# Patient Record
Sex: FEMALE | Race: WHITE | NOT HISPANIC OR LATINO | Employment: OTHER | URBAN - METROPOLITAN AREA
[De-identification: names, ages, dates, MRNs, and addresses within clinical notes are randomized per-mention and may not be internally consistent; named-entity substitution may affect disease eponyms.]

---

## 2019-04-01 ENCOUNTER — OFFICE VISIT (OUTPATIENT)
Dept: PAIN MEDICINE | Facility: CLINIC | Age: 69
End: 2019-04-01
Payer: MEDICARE

## 2019-04-01 VITALS
WEIGHT: 176.56 LBS | OXYGEN SATURATION: 95 % | SYSTOLIC BLOOD PRESSURE: 150 MMHG | DIASTOLIC BLOOD PRESSURE: 70 MMHG | BODY MASS INDEX: 30.14 KG/M2 | HEIGHT: 64 IN | RESPIRATION RATE: 20 BRPM | HEART RATE: 80 BPM | TEMPERATURE: 96 F

## 2019-04-01 DIAGNOSIS — L40.9 PSORIASIS: ICD-10-CM

## 2019-04-01 DIAGNOSIS — M51.36 DDD (DEGENERATIVE DISC DISEASE), LUMBAR: ICD-10-CM

## 2019-04-01 DIAGNOSIS — M54.16 RIGHT LUMBAR RADICULOPATHY: Primary | ICD-10-CM

## 2019-04-01 PROCEDURE — 99999 PR PBB SHADOW E&M-NEW PATIENT-LVL IV: ICD-10-PCS | Mod: PBBFAC,,, | Performed by: ANESTHESIOLOGY

## 2019-04-01 PROCEDURE — 99204 OFFICE O/P NEW MOD 45 MIN: CPT | Mod: PBBFAC,PN | Performed by: ANESTHESIOLOGY

## 2019-04-01 PROCEDURE — 99999 PR PBB SHADOW E&M-NEW PATIENT-LVL IV: CPT | Mod: PBBFAC,,, | Performed by: ANESTHESIOLOGY

## 2019-04-01 PROCEDURE — 99204 OFFICE O/P NEW MOD 45 MIN: CPT | Mod: S$PBB,,, | Performed by: ANESTHESIOLOGY

## 2019-04-01 PROCEDURE — 99204 PR OFFICE/OUTPT VISIT, NEW, LEVL IV, 45-59 MIN: ICD-10-PCS | Mod: S$PBB,,, | Performed by: ANESTHESIOLOGY

## 2019-04-01 RX ORDER — GABAPENTIN 300 MG/1
300 CAPSULE ORAL NIGHTLY
Qty: 30 CAPSULE | Refills: 1 | Status: SHIPPED | OUTPATIENT
Start: 2019-04-01 | End: 2019-05-28 | Stop reason: SDUPTHER

## 2019-04-01 RX ORDER — TRAMADOL HYDROCHLORIDE 50 MG/1
50 TABLET ORAL 3 TIMES DAILY PRN
Qty: 20 TABLET | Refills: 1 | Status: SHIPPED | OUTPATIENT
Start: 2019-04-01 | End: 2019-05-10 | Stop reason: SDUPTHER

## 2019-04-01 NOTE — PROGRESS NOTES
This note was completed with dictation software and grammatical errors may exist.    CC:Back pain    HPI:  The patient is a 68-year-old woman with a history of CVAs presents in self-referral for back and right leg pain. The patient states that she was in normal state of health until about three months ago when she began having pain in the left buttock, posterior thigh and radiating into the lower leg.  She states that there had been no incidents or trauma which may have caused this.  She states that it has become constant but is especially worse with standing for long periods, sitting too long.  She does have some pain deep in her low back but primarily  radiates into the right side.  She gets some relief with lying down, has also been taking Aleve and ibuprofen with mild relief.  She denies any joe weakness, no bowel or bladder incontinence.      ROS:  She reports weight gain, constipation, easy bruising, back pain, dizziness, anxiety, depression and loss of balance.  Balance of review of systems is negative.    Past Medical History:   Diagnosis Date    Stroke        Past Surgical History:   Procedure Laterality Date    HIP SURGERY         Social History     Socioeconomic History    Marital status:      Spouse name: None    Number of children: None    Years of education: None    Highest education level: None   Occupational History    None   Social Needs    Financial resource strain: None    Food insecurity:     Worry: None     Inability: None    Transportation needs:     Medical: None     Non-medical: None   Tobacco Use    Smoking status: Current Every Day Smoker   Substance and Sexual Activity    Alcohol use: None    Drug use: None    Sexual activity: None   Lifestyle    Physical activity:     Days per week: None     Minutes per session: None    Stress: None   Relationships    Social connections:     Talks on phone: None     Gets together: None     Attends Sikh service: None      "Active member of club or organization: None     Attends meetings of clubs or organizations: None     Relationship status: None    Intimate partner violence:     Fear of current or ex partner: None     Emotionally abused: None     Physically abused: None     Forced sexual activity: None   Other Topics Concern    None   Social History Narrative    None         Medications/Allergies: See med card    Vitals:    04/01/19 1035   BP: (!) 150/70   Pulse: 80   Resp: 20   Temp: 96.4 °F (35.8 °C)   TempSrc: Oral   SpO2: 95%   Weight: 80.1 kg (176 lb 9.4 oz)   Height: 5' 4" (1.626 m)   PainSc:   4   PainLoc: Back         Physical exam:    Gen: A and O x3, pleasant, well-groomed  Skin:  Large red plaque covering the medial mid plantar surface of the left foot, also small plaque on left palmar region  HEENT: PERRLA, no obvious deformities on ears or in canals. Trachea midline.  CVS: Regular rate and rhythm, normal palpable pulses.  Resp:No increased work of breathing, symmetrical chest rise.  Abdomen: Soft, NT/ND.  Musculoskeletal:  Walks with a slight antalgic gait, does not put full weight on right leg.    Neuro:  Lower extremities: 5/5 strength bilaterally  Reflexes: Patellar 2+, Achilles 2+ bilaterally.  Sensory:  Intact and symmetrical to light touch and pinprick in L2-S1 dermatomes bilaterally.    Lumbar spine:  Lumbar spine:  Range of motion is mildly reduced with flexion with slight increased pain back, mildly reduced with extension with no major increased pain.  Tacho's test causes no increased pain on either side.    Supine straight leg raise is mildly positive on the right side at 60°.    Internal and external rotation of the hip causes no increased pain on either side.  Myofascial exam: No tenderness to palpation across lumbar paraspinous muscles.    Imaging:  None    Assessment:   The patient is a 68-year-old woman with a history of CVAs presents in self-referral for back and right leg pain.  1. Right lumbar " radiculopathy  MRI Lumbar Spine Without Contrast   2. DDD (degenerative disc disease), lumbar  MRI Lumbar Spine Without Contrast   3. Psoriasis  Ambulatory Referral to Rheumatology       Plan:  1.  I think she has likely nerve root impingement on the right side probably at L4/5 her L5/S1 and we are going to get an MRI of her lumbar spine.  In the meantime I will also have her start taking Neurontin 300 mg nightly.  I will call her with the results.  2.  We discussed that she has a large psoriatic plaque on her left foot, also her left hand, she has never seen a rheumatologist.  I am going to get her set up with Rheumatology.

## 2019-04-16 ENCOUNTER — HOSPITAL ENCOUNTER (OUTPATIENT)
Dept: RADIOLOGY | Facility: HOSPITAL | Age: 69
Discharge: HOME | End: 2019-04-16
Attending: ANESTHESIOLOGY
Payer: MEDICARE

## 2019-04-16 DIAGNOSIS — M54.16 RIGHT LUMBAR RADICULOPATHY: ICD-10-CM

## 2019-04-16 DIAGNOSIS — M51.36 DDD (DEGENERATIVE DISC DISEASE), LUMBAR: ICD-10-CM

## 2019-04-16 PROCEDURE — 72148 MRI LUMBAR SPINE W/O DYE: CPT | Mod: TC,PO

## 2019-04-16 PROCEDURE — 72148 MRI LUMBAR SPINE W/O DYE: CPT | Mod: 26,,, | Performed by: RADIOLOGY

## 2019-04-16 PROCEDURE — 72148 MRI LUMBAR SPINE WITHOUT CONTRAST: ICD-10-PCS | Mod: 26,,, | Performed by: RADIOLOGY

## 2019-04-23 ENCOUNTER — TELEPHONE (OUTPATIENT)
Dept: PAIN MEDICINE | Facility: CLINIC | Age: 69
End: 2019-04-23

## 2019-04-23 NOTE — TELEPHONE ENCOUNTER
----- Message from Chilango Retana sent at 4/23/2019 12:38 PM CDT -----  Contact: pt  Type:  Test Results    Who Called:  pt  Name of Test (Lab/Mammo/Etc):  MRI  Date of Test:  4/16/19  Ordering Provider:  Dr. Vidales  Where the test was performed:  [facility]  Best Call Back Number:  [phone]  Additional Information:  Pt would like to know what the next step is.

## 2019-04-23 NOTE — TELEPHONE ENCOUNTER
----- Message from Rich Trotter sent at 4/23/2019  4:01 PM CDT -----  Contact: Patient  Type:  Patient Returning Call    Who Called:  Ptnt  Who Left Message for Patient: No one  Does the patient know what this is regarding?:  Calling about her MRI results from 04-16-19  Best Call Back Number:  [phone] (C) or [phone] (H)  Additional Information:  Hasn't heard from anyone since 04-16-19 about her results. Still having back pain.

## 2019-04-23 NOTE — TELEPHONE ENCOUNTER
Please let the patient know that I have reviewed her lumbar spine MRI which shows stenosis or narrowing of the spinal canal at L4/5 and also nerve impingement where the nerves exit out to both sides.  I would suggest setting up an L5/S1 BARB with spread to the right side to see if this provides relief of her back and leg pain. Please set her up with a three week follow-up after this.

## 2019-04-24 DIAGNOSIS — M54.16 LUMBAR RADICULOPATHY: Primary | ICD-10-CM

## 2019-04-24 RX ORDER — LORAZEPAM 0.5 MG/1
TABLET ORAL
Refills: 0 | COMMUNITY
Start: 2019-04-15 | End: 2022-08-17

## 2019-04-24 RX ORDER — CLOPIDOGREL BISULFATE 75 MG/1
75 TABLET ORAL DAILY
COMMUNITY

## 2019-04-24 RX ORDER — ATORVASTATIN CALCIUM 20 MG/1
20 TABLET, FILM COATED ORAL DAILY
COMMUNITY

## 2019-04-24 RX ORDER — DIAZEPAM 2 MG/1
5 TABLET ORAL NIGHTLY PRN
COMMUNITY
End: 2019-04-24 | Stop reason: SDUPTHER

## 2019-04-24 RX ORDER — ALPRAZOLAM 0.5 MG/1
1 TABLET, ORALLY DISINTEGRATING ORAL ONCE AS NEEDED
Status: CANCELLED | OUTPATIENT
Start: 2019-05-14 | End: 2030-10-09

## 2019-04-24 RX ORDER — CITALOPRAM 20 MG/1
20 TABLET, FILM COATED ORAL DAILY
COMMUNITY
End: 2022-08-17

## 2019-04-24 NOTE — TELEPHONE ENCOUNTER
Spoke to patient and explained MRI results per Dr Vidales. Answered questions about the procedure and possible relief it could provide. Patient satisfied with information and demonstrated understanding by repeating info back to me. She will be scheduled for procedure on 5/14. Clearance will be faxed to Dr Hurtado to stop plavix, and patient will be mailed a copy of pre-op instructions with follow up appointment.

## 2019-04-24 NOTE — TELEPHONE ENCOUNTER
----- Message from Osbaldo Giraldo sent at 4/24/2019  2:08 PM CDT -----  Contact: self   Patient want to speak with a nurse regarding her test results please call back at [phone] (home) [phone] (work)

## 2019-05-02 ENCOUNTER — TELEPHONE (OUTPATIENT)
Dept: PAIN MEDICINE | Facility: CLINIC | Age: 69
End: 2019-05-02

## 2019-05-02 NOTE — TELEPHONE ENCOUNTER
----- Message from Marcelina Johnson sent at 5/2/2019  4:38 PM CDT -----  Contact: Pt  Type: Needs Medical Advice    Who Called:  Pt  Best Call Back Number: [phone] (home) [phone] (work)  Additional Information:pt said she has a bad pain in her back would like a call back today

## 2019-05-02 NOTE — TELEPHONE ENCOUNTER
----- Message from Lucy Foley sent at 5/2/2019  4:10 PM CDT -----  Contact: Yolanda pt  Type: Needs Medical Advice    Who Called:  Yolanda Melgoza Call Back Number: [phone]  Additional Information: Pls call pt regarding possibly getting the injection done sooner.

## 2019-05-10 RX ORDER — TRAMADOL HYDROCHLORIDE 50 MG/1
50 TABLET ORAL 3 TIMES DAILY PRN
Qty: 20 TABLET | Refills: 1 | Status: SHIPPED | OUTPATIENT
Start: 2019-05-10 | End: 2022-11-29

## 2019-05-10 NOTE — TELEPHONE ENCOUNTER
----- Message from Debra Mtz sent at 5/10/2019 12:47 PM CDT -----  Type:  RX Refill Request    Who Called:  self  Refill or New Rx:  Refill   RX Name and Strength:   traMADol (ULTRAM) 50 mg tablet  How is the patient currently taking it? (ex. 1XDay):    [facility] [address] [place] [address] & [address] [address]  Phone: [phone] Fax: [phone]    Is this a 30 day or 90 day RX:     Preferred Pharmacy with phone number:     Local or Mail Order:     Ordering Provider:     Best Call Back Number:    [phone]   Additional Information:  Asking to have this filled today

## 2019-05-13 ENCOUNTER — TELEPHONE (OUTPATIENT)
Dept: SURGERY | Facility: HOSPITAL | Age: 69
End: 2019-05-13

## 2019-05-13 DIAGNOSIS — M51.36 DDD (DEGENERATIVE DISC DISEASE), LUMBAR: Primary | ICD-10-CM

## 2019-05-13 NOTE — TELEPHONE ENCOUNTER
Spoke with patient on antibiotics is cancelling will call back when ready to reschedule OR notified

## 2019-05-14 ENCOUNTER — TELEPHONE (OUTPATIENT)
Dept: PAIN MEDICINE | Facility: CLINIC | Age: 69
End: 2019-05-14

## 2019-05-14 NOTE — TELEPHONE ENCOUNTER
Left patient a vm stating she can  a copy of the MRI at radiology and can  a copy of the MRI report if she would like.

## 2019-05-14 NOTE — TELEPHONE ENCOUNTER
----- Message from Radhika Barker sent at 5/14/2019  1:24 PM CDT -----  Contact: self [phone] or [phone]  Patient contact # [phone] or [phone]  Patient requesting copy of MRI results.    Fax#[phone] fax to Dr Brambila

## 2019-05-15 ENCOUNTER — TELEPHONE (OUTPATIENT)
Dept: PAIN MEDICINE | Facility: CLINIC | Age: 69
End: 2019-05-15

## 2019-05-15 NOTE — TELEPHONE ENCOUNTER
----- Message from Rosario Rider sent at 5/14/2019  3:33 PM CDT -----  Contact: patient  Type: Needs Medical Advice    Who Called:  patient  Symptoms (please be specific):    How long has patient had these symptoms:    Pharmacy name and phone #:    Best Call Back Number: [phone]  Additional Information: requesting results from mri on 04/16/19 be mailed [address] [place] [address]. Patient stated that she can't come to office to pick them up.

## 2019-05-28 RX ORDER — GABAPENTIN 300 MG/1
CAPSULE ORAL
Qty: 30 CAPSULE | Refills: 3 | Status: SHIPPED | OUTPATIENT
Start: 2019-05-28 | End: 2019-10-03 | Stop reason: SDUPTHER

## 2019-10-03 RX ORDER — GABAPENTIN 300 MG/1
CAPSULE ORAL
Qty: 30 CAPSULE | Refills: 0 | Status: SHIPPED | OUTPATIENT
Start: 2019-10-03

## 2020-09-23 ENCOUNTER — OFFICE VISIT (OUTPATIENT)
Dept: OTOLARYNGOLOGY | Facility: CLINIC | Age: 70
End: 2020-09-23
Payer: MEDICARE

## 2020-09-23 VITALS — TEMPERATURE: 97 F | WEIGHT: 172.81 LBS | BODY MASS INDEX: 29.67 KG/M2

## 2020-09-23 DIAGNOSIS — S03.40XA SPRAIN OF TEMPOROMANDIBULAR JOINT, INITIAL ENCOUNTER: Primary | ICD-10-CM

## 2020-09-23 PROCEDURE — 99999 PR PBB SHADOW E&M-EST. PATIENT-LVL III: CPT | Mod: PBBFAC,,, | Performed by: OTOLARYNGOLOGY

## 2020-09-23 PROCEDURE — 99204 OFFICE O/P NEW MOD 45 MIN: CPT | Mod: S$PBB,,, | Performed by: OTOLARYNGOLOGY

## 2020-09-23 PROCEDURE — 99204 PR OFFICE/OUTPT VISIT, NEW, LEVL IV, 45-59 MIN: ICD-10-PCS | Mod: S$PBB,,, | Performed by: OTOLARYNGOLOGY

## 2020-09-23 PROCEDURE — 99999 PR PBB SHADOW E&M-EST. PATIENT-LVL III: ICD-10-PCS | Mod: PBBFAC,,, | Performed by: OTOLARYNGOLOGY

## 2020-09-23 PROCEDURE — 99213 OFFICE O/P EST LOW 20 MIN: CPT | Mod: PBBFAC,PO | Performed by: OTOLARYNGOLOGY

## 2020-09-23 RX ORDER — CYCLOBENZAPRINE HCL 10 MG
10 TABLET ORAL NIGHTLY PRN
Qty: 14 TABLET | Refills: 0 | Status: SHIPPED | OUTPATIENT
Start: 2020-09-23 | End: 2020-10-07

## 2020-09-23 RX ORDER — HYDROCODONE BITARTRATE AND ACETAMINOPHEN 7.5; 325 MG/1; MG/1
TABLET ORAL
COMMUNITY
Start: 2020-08-12 | End: 2023-10-01 | Stop reason: SDUPTHER

## 2020-09-23 RX ORDER — PREDNISONE 20 MG/1
40 TABLET ORAL DAILY
Qty: 10 TABLET | Refills: 0 | Status: SHIPPED | OUTPATIENT
Start: 2020-09-23 | End: 2020-09-28

## 2020-09-23 NOTE — PATIENT INSTRUCTIONS
Soft diet (minimal chewing) for 2 weeks.   Ibuprofen 600 mg every 8 hours for inflammation  Muscle relaxer at night time  Take steroid as prescribed  Massage the joint and use warm compresses  Follow-up if persistent after 3 weeks    Oral Steroid    You have been prescribed an oral steroid. Use as directed.    Note: This medication can be very effective in treating inflammation but may be associated with several side effects such as:    Stomach irritation (consider antacid medication while you are on prednisone),  insomnia (please take in the morning to reduce this if dosing is once daily), mood changes, high blood pressure, elevated sugar levels (especially in diabetics), infections, fluid retention, rash, swelling, tendon rupture, and hip fracture.     Please report any liver disease, diabetes, osteoporosis, stomach ulcer disease, glaucoma, history of GI bleeding, Myasthenia Gravis PRIOR to initiating this medication.        TMJ Syndrome / Sprain    This is a condition with chronic or recurrent pain in the joint of the jaw (in front of the ear). Just like all other joints in the body (knees, hips, etc.) the jaw joint can be sprained or chronically injured over time. The jaw joint capsule can wear out just like other joints in the body.    The pain may cause limited motion of the jaw, a locking or catching sensation, clicking, popping, or grinding sounds from the joint with movement. It is a very common cause of headache, earache or neck pain. Other symptoms include ringing in the ear, and pressure/fullness of the ear.    The nerve that gives sensation to the jaw joint also gives sensation to the ear and part of the face. This is why pain in the face or ear can be coming from the jaw joint. It is sometimes caused by inflammation in the joint, injury or wear-and-tear of the cartilage in the joint, involuntary grinding of the teeth or poorly fitting dentures. Emotional stress and tension are often a factor. Most  cases resolve completely within a few months with proper treatment.    Home Care:  1) Rest the jaw by avoiding crunchy or hard foods to chew. Do not eat hard or sticky candies. Soft foods and liquids are easier on the jaw. Protect your jaw while yawning.  2) Hot compress (small towel soaked in hot water or heating pad) applied to the jaw may give relief by reducing muscle spasm. Some people get relief with cold packs, so try both and see which one works best for you.  3) You may use ibuprofen (Motrin, Advil) to control pain, unless another medicine was prescribed.   If you weight between 130 and 150 lb, you may take 600 mg of Ibuprofen every 6 hours as needed for a few weeks, then less frequently following this. If you weight > 150 lb, you may take 800 mg every 6 hours for the same time period. Extended use of Ibuprofen is typically OK, but not every 6 hours (usually one or two times per day.)   [ NOTE : If you have chronic liver or kidney disease or ever had a stomach ulcer or GI bleeding, talk with your doctor before using these medicines.]  4) If you suspect emotional stress is related to your condition.  a) Try to identify the sources of stress in your life. It may not be obvious! These may include:  -- Daily hassles of life that pile up (traffic jams, missed appointments, car troubles)  -- Major life changes, both good (new baby, job promotion) and bad (loss of job, loss of loved one)  -- Overload: feeling that you have too many responsibilities and can't take care of everything at once  -- Helplessness: feeling like your problems are more than you can solve  b) When possible, do something about the source of your stress: avoid hassles, limit the amount of change that is happening in your life at one time and take a break when you feel overloaded.  c) Unfortunately, many stressful situations cannot be avoided. Therefore, it is necessary to learn HOW TO MANAGE STRESS better. There are many proven methods that  "work and will reduce your anxiety. These include simple things like exercise, good nutrition and adequate rest. Also, there are certain techniques that are helpful: relaxation and breathing exercises, visualization, biofeedback, meditation or simply taking some time-out to clear your mind. For more information about this, consult your doctor or go to a local bookstore and review the many books and tapes available on this subject.    Mouthguards for Grinding:  Some TMJ issues are worsened by nightly teeth grinding. This can create muscle tension and strain on the jaw joint. Most mouthguards protect the teeth and do NOT reduce the clenching. If the goal is to reduce clenching, I recommend trying an over the counter nightguard called "SmartGuard."  This can be purchased over-the-counter and is available through vendors such as [employer] and [employer].   This mouthguard fits only on the front teeth. As a result, it prevents your molars from contacting, preventing a forceful clench. This should be worn for brief period of time (weeks or months) as it can alter the bite with prolonged use. Most people will notice improvement during this time.    "

## 2020-09-23 NOTE — PROGRESS NOTES
Subjective:       Patient ID: Yolanda Zapata is a 70 y.o. female.    Chief Complaint: Otalgia and Ear Fullness    Yolanda is here for Right otalgia and ear fullness.  She has 1 week of ear pain and feeling of fullness in the right ear. She feels as is she is talking in the bottom of a well. She has some autophony.   She was seen at [facility] and had an audiogram showed moderate SNHL, symmetric      Pertinent meds: plavix,   Pertinent medical issues: history of CVA, R eye blindness    Social History     Tobacco Use   Smoking Status Current Every Day Smoker    Packs/day: 1.00     Social History     Substance and Sexual Activity   Alcohol Use Yes    Comment: 3 glasses of wine per month          Review of Systems   Constitutional: Negative for activity change and appetite change.   Eyes: Negative for discharge.   Respiratory: Negative for difficulty breathing and wheezing   Cardiovascular: Negative for chest pain.   Gastrointestinal: Negative for abdominal distention and abdominal pain.   Endocrine: Negative for cold intolerance and heat intolerance.   Genitourinary: Negative for dysuria.   Musculoskeletal: Negative for gait problem and joint swelling.   Skin: Negative for color change and pallor.   Neurological: Negative for syncope and weakness.   Psychiatric/Behavioral: Negative for agitation and confusion.     Objective:        Constitutional:   She is oriented to person, place, and time. She appears well-developed and well-nourished. She appears alert. She is active. Normal speech.      Head:  Normocephalic and atraumatic. Head is with TMJ tenderness (R Pre-auricular and temporalis pain). No scars. Salivary glands normal.  Facial strength is normal.      Ears:    Right Ear: No drainage or swelling. No middle ear effusion.   Left Ear: No drainage or swelling.  No middle ear effusion.     Nose:  No mucosal edema, rhinorrhea or sinus tenderness. No turbinate hypertrophy.      Mouth/Throat  Oropharynx clear and moist without  lesions or asymmetry, normal uvula midline and mirror exam normal. Normal dentition. No uvula swelling, lacerations or trismus. No oropharyngeal exudate. Tonsillar erythema, tonsillar exudate.      Neck:  Full range of motion with neck supple and no adenopathy. Thyroid tenderness is present. No tracheal deviation, no edema, no erythema, normal range of motion, no stridor, no crepitus and no neck rigidity present. No thyroid mass present.     Cardiovascular:   Intact distal pulses and normal pulses.      Pulmonary/Chest:   Effort normal and breath sounds normal. No stridor.     Psychiatric:   Her speech is normal and behavior is normal. Her mood appears not anxious. Her affect is not labile.     Neurological:   She is alert and oriented to person, place, and time. No sensory deficit.     Skin:   No abrasions, lacerations, lesions, or rashes. No abrasion and no bruising noted.         Tests / Results:  None    Assessment:       1. Sprain of temporomandibular joint, initial encounter          Plan:         Right otalgia 2/2 TMJ.  Soft diet, Prednisone, Flexeril qhs, massage and compresses    FU 3 weeks if persistent

## 2020-10-07 ENCOUNTER — CLINICAL SUPPORT (OUTPATIENT)
Dept: AUDIOLOGY | Facility: CLINIC | Age: 70
End: 2020-10-07
Payer: MEDICARE

## 2020-10-07 ENCOUNTER — OFFICE VISIT (OUTPATIENT)
Dept: OTOLARYNGOLOGY | Facility: CLINIC | Age: 70
End: 2020-10-07
Payer: MEDICARE

## 2020-10-07 VITALS
TEMPERATURE: 77 F | SYSTOLIC BLOOD PRESSURE: 139 MMHG | HEART RATE: 104 BPM | HEIGHT: 63 IN | DIASTOLIC BLOOD PRESSURE: 90 MMHG | WEIGHT: 157.44 LBS | BODY MASS INDEX: 27.89 KG/M2

## 2020-10-07 DIAGNOSIS — H90.A21 SENSORINEURAL HEARING LOSS (SNHL) OF RIGHT EAR WITH RESTRICTED HEARING OF LEFT EAR: ICD-10-CM

## 2020-10-07 DIAGNOSIS — H91.90 PERCEIVED HEARING CHANGES: ICD-10-CM

## 2020-10-07 DIAGNOSIS — H90.A32 MIXED CONDUCTIVE AND SENSORINEURAL HEARING LOSS OF LEFT EAR WITH RESTRICTED HEARING OF RIGHT EAR: Primary | ICD-10-CM

## 2020-10-07 DIAGNOSIS — H69.91 ACUTE DYSFUNCTION OF RIGHT EUSTACHIAN TUBE: Primary | ICD-10-CM

## 2020-10-07 DIAGNOSIS — H90.3 BILATERAL SENSORINEURAL HEARING LOSS: ICD-10-CM

## 2020-10-07 PROCEDURE — 99214 PR OFFICE/OUTPT VISIT, EST, LEVL IV, 30-39 MIN: ICD-10-PCS | Mod: S$PBB,,, | Performed by: NURSE PRACTITIONER

## 2020-10-07 PROCEDURE — 99999 PR PBB SHADOW E&M-EST. PATIENT-LVL V: CPT | Mod: PBBFAC,,, | Performed by: NURSE PRACTITIONER

## 2020-10-07 PROCEDURE — 99999 PR PBB SHADOW E&M-EST. PATIENT-LVL V: ICD-10-PCS | Mod: PBBFAC,,, | Performed by: NURSE PRACTITIONER

## 2020-10-07 PROCEDURE — 99215 OFFICE O/P EST HI 40 MIN: CPT | Mod: PBBFAC,PO | Performed by: NURSE PRACTITIONER

## 2020-10-07 PROCEDURE — 99214 OFFICE O/P EST MOD 30 MIN: CPT | Mod: S$PBB,,, | Performed by: NURSE PRACTITIONER

## 2020-10-07 PROCEDURE — 92567 TYMPANOMETRY: CPT | Mod: PBBFAC,PO | Performed by: AUDIOLOGIST

## 2020-10-07 PROCEDURE — 99999 PR PBB SHADOW E&M-EST. PATIENT-LVL I: CPT | Mod: PBBFAC,,,

## 2020-10-07 PROCEDURE — 92557 COMPREHENSIVE HEARING TEST: CPT | Mod: PBBFAC,PO | Performed by: AUDIOLOGIST

## 2020-10-07 PROCEDURE — 99999 PR PBB SHADOW E&M-EST. PATIENT-LVL I: ICD-10-PCS | Mod: PBBFAC,,,

## 2020-10-07 PROCEDURE — 99211 OFF/OP EST MAY X REQ PHY/QHP: CPT | Mod: PBBFAC,27,PO,25

## 2020-10-07 RX ORDER — BENAZEPRIL HYDROCHLORIDE 20 MG/1
TABLET ORAL
COMMUNITY
End: 2022-07-05 | Stop reason: ALTCHOICE

## 2020-10-07 RX ORDER — OFLOXACIN 3 MG/ML
SOLUTION/ DROPS OPHTHALMIC
COMMUNITY
Start: 2020-09-30 | End: 2022-11-29

## 2020-10-07 RX ORDER — NEPAFENAC 3 MG/ML
SUSPENSION/ DROPS OPHTHALMIC
COMMUNITY
Start: 2020-09-30 | End: 2022-11-29

## 2020-10-07 RX ORDER — FLUTICASONE PROPIONATE 50 MCG
1 SPRAY, SUSPENSION (ML) NASAL 2 TIMES DAILY
Qty: 16 G | Refills: 12 | Status: SHIPPED | OUTPATIENT
Start: 2020-10-07 | End: 2021-10-07

## 2020-10-07 RX ORDER — TEMAZEPAM 30 MG/1
CAPSULE ORAL
COMMUNITY
End: 2022-11-29

## 2020-10-07 RX ORDER — FLUOXETINE HYDROCHLORIDE 40 MG/1
CAPSULE ORAL
COMMUNITY
End: 2022-08-17

## 2020-10-07 RX ORDER — AZELASTINE 1 MG/ML
1 SPRAY, METERED NASAL 2 TIMES DAILY
Qty: 30 ML | Refills: 12 | Status: SHIPPED | OUTPATIENT
Start: 2020-10-07 | End: 2022-08-17

## 2020-10-07 RX ORDER — CYCLOBENZAPRINE HCL 10 MG
TABLET ORAL
COMMUNITY
End: 2022-07-05 | Stop reason: ALTCHOICE

## 2020-10-07 RX ORDER — AMLODIPINE AND BENAZEPRIL HYDROCHLORIDE 5; 20 MG/1; MG/1
CAPSULE ORAL
COMMUNITY
End: 2022-07-05 | Stop reason: ALTCHOICE

## 2020-10-07 NOTE — PATIENT INSTRUCTIONS
"Acute dysfunction of the RIGHT Eustachian tube:    Nasal valsalva:  Pinch nose and with closed mouth try to "pop" air into ears through the back of the nose. Attempt this several times a day. The more popping you have, the more likely the fluid will resolve.     · Flonase / fluticasone (steroid spray) is best for stuffy, pressure, fullness.  · Astelin / azelastine (antihistamine spray) is best for itchy, drippy, sneezy.    Use as directed, spraying 1-2 times in each nostril each day. It may take 2-3 days to 2-3 weeks to begin seeing improvement. This medication needs to be taken consistently to see results. Overall, this is a well-tolerated medication with low side effects. The benefit of nasal steroids as opposed to oral steroids is that the nasal steroid spray works primarily in the nose. Common side effects can include: headache, nasal dryness, minor nose bleed.  Rare side effects may include:  septal perforation, elevation in eye pressure, dry eyes, change in smell, allergic reaction.  Notify your provider if you have any concerns or experience these symptoms.     Nasal spray instructions:  Blow nose first gently to clean. Keep chin level with the floor (do not tilt head forward or back). Insert nasal spray taking caution to direct it AWAY from the middle wall inside the nose (septum) to avoid irritating nasal septum which could cause nosebleed.  Do not tilt spray up but rather flat and out along the roof of your mouth to spray. Angle the tip of the spray out slightly toward the direction of the ears; then spray. Do not take quick vigorous sniff but rather slow gentle inhalation while waiting for medication to absorb into nasal passages. Then administer second spray in same way.     "

## 2020-10-07 NOTE — PROGRESS NOTES
"Subjective:       Patient ID: Yolanda Zapata is a 70 y.o. female.    Chief Complaint: Hearing Loss (bilateral )    HPI   Patient saw Dr. Forbes on 09/23/2020 for right aural fullness/otalgia. She had an audiogram done at [facility] on 09/18/20, which showed normal sloping to moderately-severe symmetric SNHL:    Patient insists this test is inaccurate. She states she is "deaf" AD since going to a [employer] range the week prior to this hearing test above. She states when she holds up a phone to her right ear, she can hear and hold a conversation, but when is attempting to listen with both ears, she is deaf AD. She has to turn her left ear toward the person speaking.     Review of Systems   Constitutional: Negative.    HENT: Positive for hearing loss.    Eyes: Negative.    Respiratory: Negative.    Cardiovascular: Negative.    Gastrointestinal: Negative.    Musculoskeletal: Negative.    Integumentary:  Negative.   Neurological: Negative.    Hematological: Negative.    Psychiatric/Behavioral: Negative.          Objective:      Physical Exam  Vitals signs and nursing note reviewed.   Constitutional:       General: She is not in acute distress.     Appearance: She is well-developed. She is not ill-appearing or diaphoretic.   HENT:      Head: Normocephalic and atraumatic.      Right Ear: Hearing, tympanic membrane, ear canal and external ear normal. No middle ear effusion. Tympanic membrane is not erythematous. Tympanic membrane has normal mobility.      Left Ear: Hearing, tympanic membrane, ear canal and external ear normal.  No middle ear effusion. Tympanic membrane is not erythematous. Tympanic membrane has normal mobility.      Ears:      Comments: Type "A" tympanograms consistent with well-pneumatized mesotympanums and absence of middle ear effusions AU     Nose: Nose normal.      Mouth/Throat:      Pharynx: Uvula midline.   Eyes:      General: Lids are normal. No scleral icterus.        Right eye: No discharge.         " Left eye: No discharge.   Neck:      Musculoskeletal: Normal range of motion and neck supple.      Trachea: Trachea normal. No tracheal deviation.   Cardiovascular:      Rate and Rhythm: Normal rate.   Pulmonary:      Effort: Pulmonary effort is normal. No respiratory distress.      Breath sounds: No stridor. No wheezing.   Musculoskeletal: Normal range of motion.   Skin:     General: Skin is warm and dry.      Coloration: Skin is not pale.   Neurological:      Mental Status: She is alert and oriented to person, place, and time.      Coordination: Coordination normal.      Gait: Gait normal.   Psychiatric:         Speech: Speech normal.         Behavior: Behavior normal. Behavior is cooperative.         Thought Content: Thought content normal.         Judgment: Judgment normal.         Assessment:     Normal sloping to moderately-severe/severe SNHL AU, symmetric    Symmetric speech discrimination  Plan:     Reassured pt no significant asymmetry.     Flonase & Astelin for right ETD. Nasal valsalva.  Return to clinic if symptoms worsen/persist and as needed for any ENT concerns

## 2021-09-16 ENCOUNTER — TELEPHONE (OUTPATIENT)
Dept: OTOLARYNGOLOGY | Facility: CLINIC | Age: 71
End: 2021-09-16

## 2022-04-20 ENCOUNTER — TELEPHONE (OUTPATIENT)
Dept: GASTROENTEROLOGY | Facility: CLINIC | Age: 72
End: 2022-04-20
Payer: MEDICARE

## 2022-04-20 NOTE — TELEPHONE ENCOUNTER
----- Message from Keila Cervantes sent at 4/20/2022  2:39 PM CDT -----  Regarding: return call  Contact: Patient/[phone]  Type:  Patient Returning Call    Who Called:  Patient/[phone]    Who Left Message for Patient:  Saranya  Does the patient know what this is regarding?:  appointment change

## 2022-05-08 ENCOUNTER — OFFICE VISIT (OUTPATIENT)
Dept: URGENT CARE | Facility: CLINIC | Age: 72
End: 2022-05-08
Payer: MEDICARE

## 2022-05-08 VITALS
SYSTOLIC BLOOD PRESSURE: 120 MMHG | WEIGHT: 157 LBS | HEIGHT: 63 IN | BODY MASS INDEX: 27.82 KG/M2 | TEMPERATURE: 99 F | OXYGEN SATURATION: 98 % | RESPIRATION RATE: 16 BRPM | HEART RATE: 70 BPM | DIASTOLIC BLOOD PRESSURE: 75 MMHG

## 2022-05-08 DIAGNOSIS — J01.90 ACUTE SINUSITIS, RECURRENCE NOT SPECIFIED, UNSPECIFIED LOCATION: ICD-10-CM

## 2022-05-08 DIAGNOSIS — R05.9 COUGH: Primary | ICD-10-CM

## 2022-05-08 LAB
CTP QC/QA: YES
CTP QC/QA: YES
POC MOLECULAR INFLUENZA A AGN: NEGATIVE
POC MOLECULAR INFLUENZA B AGN: NEGATIVE
SARS-COV-2 RDRP RESP QL NAA+PROBE: NEGATIVE

## 2022-05-08 PROCEDURE — 99203 OFFICE O/P NEW LOW 30 MIN: CPT | Mod: S$GLB,CS,, | Performed by: PHYSICIAN ASSISTANT

## 2022-05-08 PROCEDURE — 87502 POCT INFLUENZA A/B MOLECULAR: ICD-10-PCS | Mod: QW,S$GLB,, | Performed by: PHYSICIAN ASSISTANT

## 2022-05-08 PROCEDURE — U0002: ICD-10-PCS | Mod: QW,CR,S$GLB, | Performed by: PHYSICIAN ASSISTANT

## 2022-05-08 PROCEDURE — 99203 PR OFFICE/OUTPT VISIT, NEW, LEVL III, 30-44 MIN: ICD-10-PCS | Mod: S$GLB,CS,, | Performed by: PHYSICIAN ASSISTANT

## 2022-05-08 PROCEDURE — 87502 INFLUENZA DNA AMP PROBE: CPT | Mod: QW,S$GLB,, | Performed by: PHYSICIAN ASSISTANT

## 2022-05-08 PROCEDURE — U0002 COVID-19 LAB TEST NON-CDC: HCPCS | Mod: QW,CR,S$GLB, | Performed by: PHYSICIAN ASSISTANT

## 2022-05-08 RX ORDER — CEFDINIR 300 MG/1
300 CAPSULE ORAL 2 TIMES DAILY
Qty: 20 CAPSULE | Refills: 0 | Status: SHIPPED | OUTPATIENT
Start: 2022-05-08 | End: 2022-05-18

## 2022-05-08 RX ORDER — PREDNISONE 20 MG/1
TABLET ORAL
Qty: 8 TABLET | Refills: 0 | Status: SHIPPED | OUTPATIENT
Start: 2022-05-08 | End: 2022-05-15

## 2022-05-08 RX ORDER — FLUTICASONE PROPIONATE 50 MCG
1 SPRAY, SUSPENSION (ML) NASAL DAILY
Qty: 16 G | Refills: 0 | Status: SHIPPED | OUTPATIENT
Start: 2022-05-08 | End: 2022-07-05 | Stop reason: ALTCHOICE

## 2022-05-08 NOTE — PROGRESS NOTES
"Subjective:       Patient ID: Yolanda Zapata is a 71 y.o. female.    Vitals:  height is 5' 3" (1.6 m) and weight is 71.2 kg (157 lb). Her oral temperature is 99 °F (37.2 °C). Her blood pressure is 120/75 and her pulse is 70. Her respiration is 16 and oxygen saturation is 98%.     Chief Complaint: Sinus Problem    Pt presents with fever, ear pain, sore throat, head/chest congestion, and postnasal drip x 2 days. Pain scale 2/10. OTC taken with mild relief. Pt not Covid vaccinated.    Sinus Problem  This is a new problem. The current episode started in the past 7 days. The problem has been gradually worsening since onset. Maximum temperature: 99.0. The fever has been present for 3 to 4 days. Her pain is at a severity of 2/10. The pain is mild. Associated symptoms include chills, congestion, coughing, ear pain, headaches, sinus pressure and a sore throat. Pertinent negatives include no diaphoresis or shortness of breath. Treatments tried: aleeve. The treatment provided mild relief.       Constitution: Positive for chills. Negative for sweating and fatigue.   HENT: Positive for ear pain, congestion, postnasal drip, sinus pressure and sore throat.    Neck: Negative for neck stiffness.   Cardiovascular: Negative for chest pain.   Respiratory: Positive for cough. Negative for shortness of breath and wheezing.    Gastrointestinal: Negative for abdominal pain, nausea, vomiting and diarrhea.   Musculoskeletal: Negative for muscle ache.   Neurological: Positive for headaches.       Objective:      Physical Exam   Constitutional: She is oriented to person, place, and time. She appears well-developed.  Non-toxic appearance. She does not appear ill. No distress.   HENT:   Head: Normocephalic and atraumatic.   Ears:   Right Ear: Hearing, tympanic membrane and external ear normal.   Left Ear: Hearing, tympanic membrane and external ear normal.   Nose: Right sinus exhibits maxillary sinus tenderness (mild). Right sinus exhibits no " frontal sinus tenderness. Left sinus exhibits maxillary sinus tenderness (mild). Left sinus exhibits no frontal sinus tenderness.   Mouth/Throat: Uvula is midline, oropharynx is clear and moist and mucous membranes are normal.   Eyes: Conjunctivae and EOM are normal. Pupils are equal, round, and reactive to light.   Neck: Neck supple. No neck rigidity present.   Cardiovascular: Normal rate and regular rhythm.   Pulmonary/Chest: Effort normal and breath sounds normal. No respiratory distress. She has no decreased breath sounds. She has no wheezes. She has no rhonchi.   Occasional cough. Lungs CTA.         Comments: Occasional cough. Lungs CTA.    Neurological: She is alert and oriented to person, place, and time.   Skin: Skin is warm, dry and not diaphoretic.   Psychiatric: Her speech is normal and behavior is normal. Mood normal.   Nursing note and vitals reviewed.    Office Visit on 05/08/2022   Component Date Value Ref Range Status    POC Rapid COVID 05/08/2022 Negative  Negative Final     Acceptable 05/08/2022 Yes   Final    POC Molecular Influenza A Ag 05/08/2022 Negative  Negative, Not Reported Final    POC Molecular Influenza B Ag 05/08/2022 Negative  Negative, Not Reported Final     Acceptable 05/08/2022 Yes   Final           Assessment:       1. Cough    2. Acute sinusitis, recurrence not specified, unspecified location          Plan:       Past medical hx, family hx, social hx, and current medications were provided by the patient and were reviewed. Diagnostics performed today were discussed. Dx and tx were discussed with the patient. Return to [facility] for any concern. Follow up with PCP for any persistence of problem, or worsening. ER precautions. Pt verbalized understanding of all discussed, and agreed.    Cough  -     POCT COVID-19 Rapid Screening  -     POCT Influenza A/B MOLECULAR    Acute sinusitis, recurrence not specified, unspecified location  -     predniSONE  (DELTASONE) 20 MG tablet; Take 1 tablet (20 mg total) by mouth 2 (two) times daily for 2 days, THEN 1 tablet (20 mg total) once daily for 3 days, THEN 0.5 tablets (10 mg total) once daily for 2 days.  Dispense: 8 tablet; Refill: 0  -     fluticasone propionate (FLONASE) 50 mcg/actuation nasal spray; 1 spray (50 mcg total) by Each Nostril route once daily.  Dispense: 16 g; Refill: 0  -     cefdinir (OMNICEF) 300 MG capsule; Take 1 capsule (300 mg total) by mouth 2 (two) times daily. for 10 days  Dispense: 20 capsule; Refill: 0      Patient Instructions   · Follow up with your primary care if symptoms do not improve, or you may return here at any time.  · If you were referred to a specialist, please follow up with that specialty.  · If you were prescribed antibiotics, please take them to completion.  · If you were prescribed a narcotic or any medication with sedative effects, do not drive or operate heavy equipment or machinery while taking these medications.  · You must understand that you have received treatment at an Urgent Care facility only, and that you may be released before all of your medical problems are known or treated. Urgent Care facilities are not equipped to handle life threatening emergencies. It is recommended that you seek care at an Emergency Department for further evaluation of worsening or concerning symptoms, or possibly life threatening conditions as discussed.                                        If you  smoke, please stop smoking      Wait and See Antibiotic:  You have been given a prescription for antibiotics. Your symptoms will likely resolve without antibiotics. It is recommended that you monitor yourself closely and start the antibiotic only if signs of infection, as discussed at your visit, are present. It is important to follow these instructions due to the problem of increasing antibiotic resistance.       COVID rapid testing was NEGATIVE.     Please refer to CDC website for  additional information        Over the counter and home treatment of symptoms:  Salt water gargles  Cold-eeze helps to reduce the duration of sore throat symptoms  Cepachol helps to numb the discomfort  Chloroseptic spray  Nasal saline spray reduces inflammation and dryness  Warm face compresses as often as you can  Vicks vapor rub at night  Flonase OTC or Nasacort OTC  Simple foods like chicken noodle soup help  Pedialyte helps with dehydration if lacking appetite  Rest as much as you can

## 2022-05-08 NOTE — PATIENT INSTRUCTIONS
Follow up with your primary care if symptoms do not improve, or you may return here at any time.  If you were referred to a specialist, please follow up with that specialty.  If you were prescribed antibiotics, please take them to completion.  If you were prescribed a narcotic or any medication with sedative effects, do not drive or operate heavy equipment or machinery while taking these medications.  You must understand that you have received treatment at an Urgent Care facility only, and that you may be released before all of your medical problems are known or treated. Urgent Care facilities are not equipped to handle life threatening emergencies. It is recommended that you seek care at an Emergency Department for further evaluation of worsening or concerning symptoms, or possibly life threatening conditions as discussed.                                        If you  smoke, please stop smoking      Wait and See Antibiotic:  You have been given a prescription for antibiotics. Your symptoms will likely resolve without antibiotics. It is recommended that you monitor yourself closely and start the antibiotic only if signs of infection, as discussed at your visit, are present. It is important to follow these instructions due to the problem of increasing antibiotic resistance.       COVID rapid testing was NEGATIVE.     Please refer to CDC website for additional information        Over the counter and home treatment of symptoms:  Salt water gargles  Cold-eeze helps to reduce the duration of sore throat symptoms  Cepachol helps to numb the discomfort  Chloroseptic spray  Nasal saline spray reduces inflammation and dryness  Warm face compresses as often as you can  Vicks vapor rub at night  Flonase OTC or Nasacort OTC  Simple foods like chicken noodle soup help  Pedialyte helps with dehydration if lacking appetite  Rest as much as you can

## 2022-07-05 ENCOUNTER — OFFICE VISIT (OUTPATIENT)
Dept: GASTROENTEROLOGY | Facility: CLINIC | Age: 72
End: 2022-07-05
Payer: MEDICARE

## 2022-07-05 VITALS — BODY MASS INDEX: 24.76 KG/M2 | HEIGHT: 63 IN | WEIGHT: 139.75 LBS

## 2022-07-05 DIAGNOSIS — R63.4 UNINTENTIONAL WEIGHT LOSS: ICD-10-CM

## 2022-07-05 DIAGNOSIS — K59.09 CHRONIC CONSTIPATION WITH OVERFLOW: ICD-10-CM

## 2022-07-05 DIAGNOSIS — Z90.49 S/P CHOLECYSTECTOMY: ICD-10-CM

## 2022-07-05 DIAGNOSIS — Z79.01 CURRENT USE OF ANTICOAGULANT THERAPY: ICD-10-CM

## 2022-07-05 DIAGNOSIS — R11.0 NAUSEA: ICD-10-CM

## 2022-07-05 DIAGNOSIS — R14.0 ABDOMINAL BLOATING: ICD-10-CM

## 2022-07-05 DIAGNOSIS — R10.13 EPIGASTRIC PAIN: Primary | ICD-10-CM

## 2022-07-05 PROCEDURE — 99214 OFFICE O/P EST MOD 30 MIN: CPT | Mod: PBBFAC,PO | Performed by: NURSE PRACTITIONER

## 2022-07-05 PROCEDURE — 99999 PR PBB SHADOW E&M-EST. PATIENT-LVL IV: ICD-10-PCS | Mod: PBBFAC,,, | Performed by: NURSE PRACTITIONER

## 2022-07-05 PROCEDURE — 99214 OFFICE O/P EST MOD 30 MIN: CPT | Mod: S$PBB,,, | Performed by: NURSE PRACTITIONER

## 2022-07-05 PROCEDURE — 99214 PR OFFICE/OUTPT VISIT, EST, LEVL IV, 30-39 MIN: ICD-10-PCS | Mod: S$PBB,,, | Performed by: NURSE PRACTITIONER

## 2022-07-05 PROCEDURE — 99999 PR PBB SHADOW E&M-EST. PATIENT-LVL IV: CPT | Mod: PBBFAC,,, | Performed by: NURSE PRACTITIONER

## 2022-07-05 RX ORDER — PANTOPRAZOLE SODIUM 40 MG/1
40 TABLET, DELAYED RELEASE ORAL DAILY
Qty: 30 TABLET | Refills: 2 | Status: SHIPPED | OUTPATIENT
Start: 2022-07-05 | End: 2022-09-14

## 2022-07-05 RX ORDER — VALACYCLOVIR HYDROCHLORIDE 1 G/1
TABLET, FILM COATED ORAL
COMMUNITY
End: 2022-08-17

## 2022-07-05 RX ORDER — CEFDINIR 300 MG/1
CAPSULE ORAL
COMMUNITY
End: 2022-08-17

## 2022-07-05 RX ORDER — HYDROCODONE BITARTRATE AND ACETAMINOPHEN 10; 325 MG/1; MG/1
TABLET ORAL
COMMUNITY
End: 2022-07-05 | Stop reason: DRUGHIGH

## 2022-07-05 RX ORDER — DICYCLOMINE HYDROCHLORIDE 10 MG/1
CAPSULE ORAL
COMMUNITY
End: 2022-11-29

## 2022-07-05 RX ORDER — ONDANSETRON 4 MG/1
TABLET, FILM COATED ORAL
COMMUNITY
End: 2022-07-05

## 2022-07-05 RX ORDER — AMOXICILLIN 875 MG/1
TABLET, FILM COATED ORAL
COMMUNITY
Start: 2022-04-07 | End: 2022-11-29

## 2022-07-05 RX ORDER — POTASSIUM CHLORIDE 1500 MG/1
TABLET, EXTENDED RELEASE ORAL
COMMUNITY
End: 2022-11-29

## 2022-07-05 RX ORDER — ONDANSETRON HYDROCHLORIDE 8 MG/1
8 TABLET, FILM COATED ORAL EVERY 12 HOURS PRN
Qty: 30 TABLET | Refills: 0 | Status: SHIPPED | OUTPATIENT
Start: 2022-07-05 | End: 2022-09-25

## 2022-07-05 RX ORDER — POLYETHYLENE GLYCOL 3350 17 G/17G
17 POWDER, FOR SOLUTION ORAL DAILY
Qty: 510 G | Refills: 1 | Status: SHIPPED | OUTPATIENT
Start: 2022-07-05 | End: 2022-11-25

## 2022-07-05 NOTE — PROGRESS NOTES
Subjective:       Patient ID: Yolanda Zaptaa is a 71 y.o. female, Body mass index is 24.76 kg/m².    Chief Complaint: Abdominal Pain      Patient is new to me. Former patient of Dr. Ritchie.    Abdominal Pain  This is a chronic problem. The current episode started more than 1 month ago (Started in 9/2021). The onset quality is gradual. The problem occurs constantly (fluctuates in severity). The problem has been unchanged. The pain is located in the epigastric region. The quality of the pain is a sensation of fullness and aching. The abdominal pain radiates to the back. Associated symptoms include anorexia, belching, constipation (will go couple days without a bowel movement then diarrhea for day or two, cycle repeats; reports frequent straining to have a bowel movement; took Linzess in the past with little relief), diarrhea, flatus, nausea (Zofran helps) and weight loss (reported weight loss of 30 pounds since 9/2021; documented weight loss of 18 pounds since 5/2022; eating less due to symptoms). Pertinent negatives include no dysuria, fever (reports low grade temp), frequency, hematochezia, melena or vomiting. Associated symptoms comments: Associated symptoms also include abdominal bloating. The pain is aggravated by eating and palpation. The pain is relieved by nothing. She has tried nothing (she did not start Bentyl) for the symptoms. Prior diagnostic workup includes GI consult, lower endoscopy, upper endoscopy and surgery. Her past medical history is significant for abdominal surgery and gallstones (Hx of cholecystectomy in 12/2021). There is no history of colon cancer, Crohn's disease, GERD, irritable bowel syndrome, pancreatitis, PUD or ulcerative colitis.     Review of Systems   Constitutional: Positive for fatigue, unexpected weight change and weight loss (reported weight loss of 30 pounds since 9/2021; documented weight loss of 18 pounds since 5/2022; eating less due to symptoms). Negative for appetite change  and fever (reports low grade temp).   HENT: Negative for trouble swallowing.    Respiratory: Negative for cough and shortness of breath.    Cardiovascular: Negative for chest pain.   Gastrointestinal: Positive for abdominal pain, anorexia, constipation (will go couple days without a bowel movement then diarrhea for day or two, cycle repeats; reports frequent straining to have a bowel movement; took Linzess in the past with little relief), diarrhea, flatus and nausea (Zofran helps). Negative for abdominal distention, anal bleeding, blood in stool, hematochezia, melena, rectal pain and vomiting.   Genitourinary: Negative for difficulty urinating, dysuria and frequency.   Musculoskeletal: Positive for gait problem.   Skin: Negative for rash.   Neurological: Negative for speech difficulty.   Psychiatric/Behavioral: Negative for confusion.       Past Medical History:   Diagnosis Date    Stroke       Past Surgical History:   Procedure Laterality Date    CHOLECYSTECTOMY  12/2021    COLONOSCOPY  12/2021    Dr. Ritchie; polyps removed per pt report    HIP SURGERY      UPPER GASTROINTESTINAL ENDOSCOPY  12/2021    JOSE Ritchie; unremarkable per pt report      No family history on file.   Wt Readings from Last 10 Encounters:   07/05/22 63.4 kg (139 lb 12.4 oz)   05/08/22 71.2 kg (157 lb)   10/07/20 71.4 kg (157 lb 6.5 oz)   09/23/20 78.4 kg (172 lb 13.5 oz)   04/01/19 80.1 kg (176 lb 9.4 oz)   04/16/13 76.2 kg (168 lb)              Reviewed prior medical records including endoscopy history (see surgical history).     Objective:      Physical Exam  Constitutional:       General: She is not in acute distress.     Appearance: She is well-developed.   HENT:      Head: Normocephalic.      Right Ear: Hearing normal.      Left Ear: Hearing normal.      Nose: Nose normal.      Mouth/Throat:      Mouth: No oral lesions.      Pharynx: Uvula midline.   Eyes:      General: Lids are normal.      Conjunctiva/sclera: Conjunctivae normal.       Pupils: Pupils are equal, round, and reactive to light.   Neck:      Trachea: Trachea normal.   Cardiovascular:      Rate and Rhythm: Normal rate and regular rhythm.      Heart sounds: Normal heart sounds. No murmur heard.  Pulmonary:      Effort: Pulmonary effort is normal. No respiratory distress.      Breath sounds: Normal breath sounds. No stridor. No wheezing.   Abdominal:      General: Bowel sounds are normal. There is no distension.      Palpations: Abdomen is soft. There is no mass.      Tenderness: There is abdominal tenderness in the right upper quadrant, epigastric area and left upper quadrant. There is no guarding or rebound.   Musculoskeletal:         General: Normal range of motion.      Cervical back: Normal range of motion.   Skin:     General: Skin is warm and dry.      Findings: No rash.      Comments: Non jaundiced   Neurological:      Mental Status: She is alert and oriented to person, place, and time.   Psychiatric:         Speech: Speech normal.         Behavior: Behavior normal. Behavior is cooperative.           Assessment:       1. Epigastric pain    2. Nausea    3. Chronic constipation with overflow    4. Abdominal bloating    5. Unintentional weight loss    6. Current use of anticoagulant therapy    7. S/P cholecystectomy           Plan:   Patient agreed to sign medical release form to obtain records from Dr. Ritchie's office.     All diagnoses and orders for this visit:    Epigastric pain & Nausea   - Start: pantoprazole (PROTONIX) 40 MG tablet; Take 1 tablet (40 mg total) by mouth once daily.  Dispense: 30 tablet; Refill: 2  - Refill and continue: ondansetron (ZOFRAN) 8 MG tablet; Take 1 tablet (8 mg total) by mouth every 12 (twelve) hours as needed for Nausea.  Dispense: 30 tablet; Refill: 0  - CT Abdomen Pelvis With Contrast; Future; Expected date: 07/05/2022  - Creatinine, serum; Future; Expected date: 07/05/2022  - Schedule EGD  - Take PPI in the morning 30 minutes before  breakfast  - Recommend to avoid large meals, avoid eating within 3 hours of bedtime, elevate head of bed if nocturnal symptoms are present, smoking cessation (if current smoker), & weight loss (if overweight).   - Recommend minimize/avoid high-fat foods, chocolate, caffeine, citrus, alcohol, & tomato products.  - Advised to avoid/limit use of NSAID's, since they can cause GI upset, bleeding, and/or ulcers. If needed, take with food.     Chronic constipation with overflow & Abdominal bloating  - Start: polyethylene glycol (GLYCOLAX) 17 gram/dose powder; Take 17 g by mouth once daily.  Dispense: 510 g; Refill: 1  - Recommend daily exercise as tolerated, adequate water intake, and high fiber diet.   - Recommend OTC fiber supplement daily  - Recommended OTC simethicone as directed, such as Phazyme or Gas-x  - Discussed with patient about low gas diet: Reduce or eliminate these foods from your diet: Broccoli, Cauliflower, [place] sprouts, Cabbage, Cooked dried beans, Carbonated beverages (sparkling water, soda, beer, champagne)    Unintentional weight loss  - CT Abdomen Pelvis With Contrast; Future; Expected date: 07/05/2022  - Creatinine, serum; Future; Expected date: 07/05/2022  - Schedule EGD        - Encouraged PO intake and daily calorie counts to ensure adequate nutrition is taken in, recommend at least 1,800-2,000 calories a day        - Recommend nutritional drinks, such as Boost, Ensure or Glucerna, to supplement nutrition needs    Current use of anticoagulant therapy   - Informed patient that the anticoagulant(s) will likely need to be held for endoscopy, nurse will confirm with cardiologist/PCP.    S/P cholecystectomy    Recommend follow-up with Dr. Horta in 4-6 weeks for continued evaluation and management.  If no improvement in symptoms or symptoms worsen, call/follow-up at clinic or go to ER.

## 2022-07-12 ENCOUNTER — HOSPITAL ENCOUNTER (OUTPATIENT)
Dept: RADIOLOGY | Facility: HOSPITAL | Age: 72
Discharge: HOME | End: 2022-07-12
Attending: NURSE PRACTITIONER
Payer: MEDICARE

## 2022-07-12 ENCOUNTER — TELEPHONE (OUTPATIENT)
Dept: GASTROENTEROLOGY | Facility: CLINIC | Age: 72
End: 2022-07-12
Payer: MEDICARE

## 2022-07-12 DIAGNOSIS — R10.13 EPIGASTRIC PAIN: ICD-10-CM

## 2022-07-12 DIAGNOSIS — N28.89 OTHER SPECIFIED DISORDERS OF KIDNEY AND URETER: ICD-10-CM

## 2022-07-12 DIAGNOSIS — R63.4 UNINTENTIONAL WEIGHT LOSS: ICD-10-CM

## 2022-07-12 DIAGNOSIS — N28.9 KIDNEY LESION, NATIVE, LEFT: ICD-10-CM

## 2022-07-12 DIAGNOSIS — R93.5 ABNORMAL CT OF THE ABDOMEN: Primary | ICD-10-CM

## 2022-07-12 PROCEDURE — A9698 NON-RAD CONTRAST MATERIALNOC: HCPCS | Mod: PO | Performed by: NURSE PRACTITIONER

## 2022-07-12 PROCEDURE — 74177 CT ABD & PELVIS W/CONTRAST: CPT | Mod: TC,PO

## 2022-07-12 PROCEDURE — 74177 CT ABDOMEN PELVIS WITH CONTRAST: ICD-10-PCS | Mod: 26,,, | Performed by: RADIOLOGY

## 2022-07-12 PROCEDURE — 25500020 PHARM REV CODE 255: Mod: PO | Performed by: NURSE PRACTITIONER

## 2022-07-12 PROCEDURE — 74177 CT ABD & PELVIS W/CONTRAST: CPT | Mod: 26,,, | Performed by: RADIOLOGY

## 2022-07-12 RX ADMIN — BARIUM SULFATE 900 ML: 20 SUSPENSION ORAL at 11:07

## 2022-07-12 RX ADMIN — IOHEXOL 75 ML: 350 INJECTION, SOLUTION INTRAVENOUS at 10:07

## 2022-07-12 NOTE — TELEPHONE ENCOUNTER
Let patient know her CT of abdomen and pelvis showed wall thickening of pylorus (opening from the stomach into the duodenum), lung nodule, left kidney lesion, and diverticulosis without evidence of diverticulitis. Recommend she continue with scheduled EGD for further evaluation of wall thickening of pylorus and MRI of abdomen for further evaluation of left kidney lesion. Recommend she follow-up with her PCP for further management of lung nodule.

## 2022-07-13 ENCOUNTER — TELEPHONE (OUTPATIENT)
Dept: GASTROENTEROLOGY | Facility: CLINIC | Age: 72
End: 2022-07-13
Payer: MEDICARE

## 2022-07-13 NOTE — TELEPHONE ENCOUNTER
----- Message from Gennaro Finch, Patient Care Assistant sent at 7/13/2022 10:03 AM CDT -----  Contact: Pt  Type: Return Call    Who Called: Pt  Who Left Message for Pt: Vaibhav  Does the patient know what this is regarding: Yes/Results  Best Call Back Number: [phone]  Thank you~

## 2022-07-13 NOTE — TELEPHONE ENCOUNTER
Pt returning call to clinic. Notified pt of results and recommendations per Shanita Estrada NP. Pt verbalized understanding

## 2022-07-22 ENCOUNTER — TELEPHONE (OUTPATIENT)
Dept: GASTROENTEROLOGY | Facility: CLINIC | Age: 72
End: 2022-07-22
Payer: MEDICARE

## 2022-07-22 DIAGNOSIS — N28.9 KIDNEY LESION: ICD-10-CM

## 2022-07-22 DIAGNOSIS — R93.5 ABNORMAL MRI OF ABDOMEN: Primary | ICD-10-CM

## 2022-07-22 NOTE — TELEPHONE ENCOUNTER
Let patient know her MRI of abdomen showed a left kidney cystic lesion (possible Bosniak cyst); otherwise, unremarkable. Recommend a referral to urology for further evaluation of kidney lesion.

## 2022-07-25 ENCOUNTER — TREATMENT PLANNING (OUTPATIENT)
Dept: GASTROENTEROLOGY | Facility: CLINIC | Age: 72
End: 2022-07-25
Payer: MEDICARE

## 2022-07-25 NOTE — PROGRESS NOTES
EGD and colonoscopy reports received from [facility]. Medical and surgical history updated. Next surveillance colonoscopy for history of colon polyps due 11/2026. Records sent for scanning.

## 2022-08-15 ENCOUNTER — TELEPHONE (OUTPATIENT)
Dept: UROLOGY | Facility: CLINIC | Age: 72
End: 2022-08-15
Payer: MEDICARE

## 2022-08-15 NOTE — TELEPHONE ENCOUNTER
----- Message from Phani Melo sent at 8/15/2022  8:45 AM CDT -----  Type:  Patient Returning Call    Who Called:  Patient  Who Left Message for Patient:  Sheila   Does the patient know what this is regarding?:  Appointment  Best Call Back Number:  [phone]  Additional Information:

## 2022-08-15 NOTE — TELEPHONE ENCOUNTER
LAYTON on her cell phone and no answer on her home phone, for the pt to call the office about her appointment with Ivanna Tatum N.P.  This appt needs to rescheduled with Dr De La Rosa, the pt may need surgery.

## 2022-08-17 ENCOUNTER — OFFICE VISIT (OUTPATIENT)
Dept: UROLOGY | Facility: CLINIC | Age: 72
End: 2022-08-17
Payer: MEDICARE

## 2022-08-17 VITALS — WEIGHT: 145.06 LBS | HEIGHT: 63 IN | BODY MASS INDEX: 25.7 KG/M2

## 2022-08-17 DIAGNOSIS — N28.1 RENAL CYST, ACQUIRED, LEFT: Primary | ICD-10-CM

## 2022-08-17 PROCEDURE — 99213 OFFICE O/P EST LOW 20 MIN: CPT | Mod: PBBFAC,PO | Performed by: UROLOGY

## 2022-08-17 PROCEDURE — 99203 OFFICE O/P NEW LOW 30 MIN: CPT | Mod: S$PBB,,, | Performed by: UROLOGY

## 2022-08-17 PROCEDURE — 99203 PR OFFICE/OUTPT VISIT, NEW, LEVL III, 30-44 MIN: ICD-10-PCS | Mod: S$PBB,,, | Performed by: UROLOGY

## 2022-08-17 PROCEDURE — 99999 PR PBB SHADOW E&M-EST. PATIENT-LVL III: CPT | Mod: PBBFAC,,, | Performed by: UROLOGY

## 2022-08-17 PROCEDURE — 99999 PR PBB SHADOW E&M-EST. PATIENT-LVL III: ICD-10-PCS | Mod: PBBFAC,,, | Performed by: UROLOGY

## 2022-08-17 RX ORDER — OMEPRAZOLE 40 MG/1
40 CAPSULE, DELAYED RELEASE ORAL DAILY
COMMUNITY
Start: 2022-07-25 | End: 2022-11-29

## 2022-08-17 RX ORDER — ALPRAZOLAM 0.5 MG/1
0.5 TABLET ORAL 2 TIMES DAILY PRN
COMMUNITY
Start: 2022-08-06 | End: 2022-11-29

## 2022-08-17 RX ORDER — ESCITALOPRAM OXALATE 20 MG/1
20 TABLET ORAL DAILY
COMMUNITY
Start: 2022-07-07 | End: 2022-08-17

## 2022-08-17 RX ORDER — FLUOXETINE HYDROCHLORIDE 20 MG/1
CAPSULE ORAL
COMMUNITY
End: 2022-11-29

## 2022-08-17 NOTE — PROGRESS NOTES
Subjective:       Patient ID: Yolanda Zapata is a 72 y.o. female.    Chief Complaint: cyst on kidneys and Flank Pain (Left side)    HPI     72-year-old with an incidental renal cyst.  She is otherwise asymptomatic.  She denies flank pain.  She has no bothersome urinary symptoms.  She denies hematuria and dysuria.  She initially underwent contrast enhanced CT scan which noted an indeterminate 1 cm left upper pole lesion.  She was then followed with a contrast enhanced MRI which again noted a primary cystic mass with mild peripheral enhancement peripheral enhancement and a radiographic diagnosis of Bosniak class 3 cyst.    1. Somewhat heterogenous partially cystic lesion with some subtle heterogenous enhancement centrally and thin enhancement along the periphery following contrast administration. Findings suggest a Bosniak III classification cystic mass.    Past Medical History:   Diagnosis Date    Esophageal candidiasis     Gastritis     Personal history of colonic polyps     Stroke      Past Surgical History:   Procedure Laterality Date    CHOLECYSTECTOMY  12/2021    COLONOSCOPY  11/12/2021    Dr. Ritchie; polyps removed; diverticulosis; repeat in 5 years    HIP SURGERY      UPPER GASTROINTESTINAL ENDOSCOPY  10/15/2021    JOSE Ritchie; esophageal candidiasis; gastritis; Bx: fungal pseudohyphae and rare budding yeast; otherwise, unremarkable       Current Outpatient Medications:     ALPRAZolam (XANAX) 0.5 MG tablet, Take 0.5 mg by mouth 2 (two) times daily as needed., Disp: , Rfl:     amoxicillin (AMOXIL) 875 MG tablet, For dental work, Disp: , Rfl:     atorvastatin (LIPITOR) 20 MG tablet, Take 20 mg by mouth once daily., Disp: , Rfl:     clopidogrel (PLAVIX) 75 mg tablet, Take 75 mg by mouth once daily., Disp: , Rfl:     dicyclomine (BENTYL) 10 MG capsule, dicyclomine 10 mg capsule  TAKE 1 CAPSULE BY MOUTH THREE TIMES DAILY AS NEEDED, Disp: , Rfl:     FLUoxetine 20 MG capsule, 1 capsule in the morning,  Disp: , Rfl:     gabapentin (NEURONTIN) 300 MG capsule, TAKE 1 CAPSULE(300 MG) BY MOUTH EVERY EVENING, Disp: 30 capsule, Rfl: 0    HYDROcodone-acetaminophen (NORCO) 7.5-325 mg per tablet, TK 1 T PO BID, Disp: , Rfl:     ILEVRO 0.3 % DrpS, , Disp: , Rfl:     ofloxacin (OCUFLOX) 0.3 % ophthalmic solution, , Disp: , Rfl:     omeprazole (PRILOSEC) 40 MG capsule, Take 40 mg by mouth once daily., Disp: , Rfl:     ondansetron (ZOFRAN) 8 MG tablet, Take 1 tablet (8 mg total) by mouth every 12 (twelve) hours as needed for Nausea., Disp: 30 tablet, Rfl: 0    pantoprazole (PROTONIX) 40 MG tablet, Take 1 tablet (40 mg total) by mouth once daily., Disp: 30 tablet, Rfl: 2    polyethylene glycol (GLYCOLAX) 17 gram/dose powder, Take 17 g by mouth once daily., Disp: 510 g, Rfl: 1    potassium chloride (K-TAB) 20 mEq, potassium chloride ER 20 mEq tablet,extended release  TAKE 1 TABLET BY MOUTH EVERY DAY, Disp: , Rfl:     temazepam (RESTORIL) 30 mg capsule, temazepam 30 mg capsule  1 capsule at bedtime as needed; 30 MG; Once a day, Disp: , Rfl:     traMADol (ULTRAM) 50 mg tablet, Take 1 tablet (50 mg total) by mouth 3 (three) times daily as needed for Pain., Disp: 20 tablet, Rfl: 1    vit A/vit C/vit E/zinc/copper (PRESERVISION AREDS ORAL), PreserVision AREDS, Disp: , Rfl:     azelastine (ASTELIN) 137 mcg (0.1 %) nasal spray, 1 spray (137 mcg total) by Nasal route 2 (two) times daily. (Patient not taking: Reported on 8/17/2022), Disp: 30 mL, Rfl: 12    cefdinir (OMNICEF) 300 MG capsule, cefdinir 300 mg capsule, Disp: , Rfl:     Current Facility-Administered Medications:     triamcinolone acetonide injection 80 mg, 80 mg, INTRABURSAL, 1 time in Clinic/HOD, Westley Lacy MD      Review of Systems   Constitutional: Negative for fever.   Eyes: Negative for visual disturbance.   Respiratory: Negative for shortness of breath.    Cardiovascular: Negative for chest pain.   Genitourinary: Negative for dysuria, flank pain and  hematuria.   Musculoskeletal: Negative for gait problem.   Skin: Negative for rash.   Neurological: Negative for seizures.   Psychiatric/Behavioral: Negative for confusion.       Objective:      Physical Exam  Vitals reviewed.   Constitutional:       Appearance: She is well-developed.   HENT:      Head: Normocephalic.   Eyes:      Conjunctiva/sclera: Conjunctivae normal.   Cardiovascular:      Rate and Rhythm: Normal rate.   Pulmonary:      Effort: Pulmonary effort is normal.   Abdominal:      Palpations: Abdomen is soft.      Tenderness: There is no right CVA tenderness or left CVA tenderness.   Genitourinary:     Comments: Bladder non-tender and nondistended  No CVA tenderness  Musculoskeletal:      Cervical back: Normal range of motion.   Skin:     General: Skin is warm and dry.      Findings: No erythema or rash.   Neurological:      Mental Status: She is alert and oriented to person, place, and time.   Psychiatric:         Behavior: Behavior normal.         Assessment:       1. Renal cyst, acquired, left        Plan:       Renal cyst, acquired, left  -     CT Abdomen Pelvis W Wo Contrast; Future; Expected date: 02/17/2023      the lesion is very small at 1 cm and therefore difficult to accurately characterize.  We discussed the possibility of enhancement seen on MRI.  We discussed the risk of malignancy with Bosniak class 3 cyst which is about 50%.  We discussed treatment options for small renal mass including observation versus percutaneous ablation versus surgery.  Given the small size of this lesion, I recommend observation for now.  Follow up 6 months with repeat CT scan

## 2022-09-14 DIAGNOSIS — R10.13 EPIGASTRIC PAIN: ICD-10-CM

## 2022-09-14 RX ORDER — PANTOPRAZOLE SODIUM 40 MG/1
TABLET, DELAYED RELEASE ORAL
Qty: 30 TABLET | Refills: 2 | Status: SHIPPED | OUTPATIENT
Start: 2022-09-14 | End: 2022-12-13

## 2022-09-15 NOTE — TELEPHONE ENCOUNTER
Called and LVM to remind patient of EGD with Dr Horta on 9/21/22. She should have a light meal the evening before and only water after midnight until 4 hours before procedure. Someone from surgery center will call a day or two before with an arrive time. Asked that she please send message or call our clinic with any questions.

## 2022-09-16 ENCOUNTER — TELEPHONE (OUTPATIENT)
Dept: GASTROENTEROLOGY | Facility: CLINIC | Age: 72
End: 2022-09-16
Payer: MEDICARE

## 2022-09-16 NOTE — TELEPHONE ENCOUNTER
----- Message from Alis Glasgow sent at 9/16/2022  8:59 AM CDT -----  Contact: [phone]  Type: Needs Medical Advice  Who Called:  Pt     Best Call Back Number:[phone]  Additional Information: Pt is calling to Dr Horta is doing her procedure on 9/21. If not him than she would like to reschedule. Pls call back and advise

## 2022-09-20 ENCOUNTER — ANESTHESIA EVENT (OUTPATIENT)
Dept: ENDOSCOPY | Facility: HOSPITAL | Age: 72
End: 2022-09-20
Payer: MEDICARE

## 2022-09-21 ENCOUNTER — ANESTHESIA (OUTPATIENT)
Dept: ENDOSCOPY | Facility: HOSPITAL | Age: 72
End: 2022-09-21
Payer: MEDICARE

## 2022-09-21 ENCOUNTER — TELEPHONE (OUTPATIENT)
Dept: GASTROENTEROLOGY | Facility: CLINIC | Age: 72
End: 2022-09-21
Payer: MEDICARE

## 2022-09-21 ENCOUNTER — HOSPITAL ENCOUNTER (OUTPATIENT)
Facility: HOSPITAL | Age: 72
Discharge: HOME | End: 2022-09-21
Attending: INTERNAL MEDICINE | Admitting: INTERNAL MEDICINE
Payer: MEDICARE

## 2022-09-21 DIAGNOSIS — R10.13 EPIGASTRIC PAIN: ICD-10-CM

## 2022-09-21 LAB
CTP QC/QA: YES
SARS-COV-2 AG RESP QL IA.RAPID: NEGATIVE

## 2022-09-21 PROCEDURE — 27201012 HC FORCEPS, HOT/COLD, DISP: Mod: PO | Performed by: INTERNAL MEDICINE

## 2022-09-21 PROCEDURE — 88305 TISSUE EXAM BY PATHOLOGIST: ICD-10-PCS | Mod: 26,,, | Performed by: PATHOLOGY

## 2022-09-21 PROCEDURE — 63600175 PHARM REV CODE 636 W HCPCS: Mod: PO | Performed by: INTERNAL MEDICINE

## 2022-09-21 PROCEDURE — 43239 EGD BIOPSY SINGLE/MULTIPLE: CPT | Mod: PO | Performed by: INTERNAL MEDICINE

## 2022-09-21 PROCEDURE — D9220A PRA ANESTHESIA: ICD-10-PCS | Mod: CRNA,,, | Performed by: NURSE ANESTHETIST, CERTIFIED REGISTERED

## 2022-09-21 PROCEDURE — 43239 EGD BIOPSY SINGLE/MULTIPLE: CPT | Mod: ,,, | Performed by: INTERNAL MEDICINE

## 2022-09-21 PROCEDURE — D9220A PRA ANESTHESIA: ICD-10-PCS | Mod: ANES,,, | Performed by: ANESTHESIOLOGY

## 2022-09-21 PROCEDURE — D9220A PRA ANESTHESIA: Mod: CRNA,,, | Performed by: NURSE ANESTHETIST, CERTIFIED REGISTERED

## 2022-09-21 PROCEDURE — 25000003 PHARM REV CODE 250: Mod: PO | Performed by: NURSE ANESTHETIST, CERTIFIED REGISTERED

## 2022-09-21 PROCEDURE — 88305 TISSUE EXAM BY PATHOLOGIST: CPT | Mod: 26,,, | Performed by: PATHOLOGY

## 2022-09-21 PROCEDURE — 43239 PR EGD, FLEX, W/BIOPSY, SGL/MULTI: ICD-10-PCS | Mod: ,,, | Performed by: INTERNAL MEDICINE

## 2022-09-21 PROCEDURE — 37000008 HC ANESTHESIA 1ST 15 MINUTES: Mod: PO | Performed by: INTERNAL MEDICINE

## 2022-09-21 PROCEDURE — 63600175 PHARM REV CODE 636 W HCPCS: Mod: PO | Performed by: NURSE ANESTHETIST, CERTIFIED REGISTERED

## 2022-09-21 PROCEDURE — 88305 TISSUE EXAM BY PATHOLOGIST: CPT | Performed by: PATHOLOGY

## 2022-09-21 PROCEDURE — D9220A PRA ANESTHESIA: Mod: ANES,,, | Performed by: ANESTHESIOLOGY

## 2022-09-21 RX ORDER — PROPOFOL 10 MG/ML
VIAL (ML) INTRAVENOUS
Status: DISCONTINUED | OUTPATIENT
Start: 2022-09-21 | End: 2022-09-21

## 2022-09-21 RX ORDER — LIDOCAINE HCL/PF 100 MG/5ML
SYRINGE (ML) INTRAVENOUS
Status: DISCONTINUED | OUTPATIENT
Start: 2022-09-21 | End: 2022-09-21

## 2022-09-21 RX ORDER — SODIUM CHLORIDE, SODIUM LACTATE, POTASSIUM CHLORIDE, CALCIUM CHLORIDE 600; 310; 30; 20 MG/100ML; MG/100ML; MG/100ML; MG/100ML
INJECTION, SOLUTION INTRAVENOUS CONTINUOUS
Status: DISCONTINUED | OUTPATIENT
Start: 2022-09-21 | End: 2022-09-21 | Stop reason: HOSPADM

## 2022-09-21 RX ORDER — SODIUM CHLORIDE 0.9 % (FLUSH) 0.9 %
10 SYRINGE (ML) INJECTION
Status: DISCONTINUED | OUTPATIENT
Start: 2022-09-21 | End: 2022-09-21 | Stop reason: HOSPADM

## 2022-09-21 RX ADMIN — PROPOFOL 25 MG: 10 INJECTION, EMULSION INTRAVENOUS at 09:09

## 2022-09-21 RX ADMIN — LIDOCAINE HYDROCHLORIDE 100 MG: 20 INJECTION, SOLUTION INTRAVENOUS at 08:09

## 2022-09-21 RX ADMIN — PROPOFOL 150 MG: 10 INJECTION, EMULSION INTRAVENOUS at 08:09

## 2022-09-21 RX ADMIN — PROPOFOL 50 MG: 10 INJECTION, EMULSION INTRAVENOUS at 09:09

## 2022-09-21 RX ADMIN — SODIUM CHLORIDE, SODIUM LACTATE, POTASSIUM CHLORIDE, AND CALCIUM CHLORIDE: .6; .31; .03; .02 INJECTION, SOLUTION INTRAVENOUS at 08:09

## 2022-09-21 NOTE — ANESTHESIA POSTPROCEDURE EVALUATION
Anesthesia Post Evaluation    Patient: Yolanda Zapata    Procedure(s) Performed: Procedure(s) (LRB):  EGD (ESOPHAGOGASTRODUODENOSCOPY) (N/A)    Final Anesthesia Type: general      Patient location during evaluation: PACU  Patient participation: Yes- Able to Participate  Level of consciousness: awake and alert and oriented  Post-procedure vital signs: reviewed and stable  Pain management: adequate  Airway patency: patent    PONV status at discharge: No PONV  Anesthetic complications: no      Cardiovascular status: blood pressure returned to baseline  Respiratory status: unassisted, spontaneous ventilation and room air  Hydration status: euvolemic  Follow-up not needed.          Vitals Value Taken Time   /60 09/21/22 0920   Temp 36.4 °C (97.5 °F) 09/21/22 0910   Pulse 60 09/21/22 0920   Resp 18 09/21/22 0920   SpO2 97 % 09/21/22 0920         No case tracking events are documented in the log.      Pain/Kolby Score: Kolby Score: 9 (9/21/2022  9:10 AM)

## 2022-09-21 NOTE — TRANSFER OF CARE
"Anesthesia Transfer of Care Note    Patient: Yolanda Zapata    Procedure(s) Performed: Procedure(s) (LRB):  EGD (ESOPHAGOGASTRODUODENOSCOPY) (N/A)    Patient location: PACU    Anesthesia Type: general    Transport from OR: Transported from OR on room air with adequate spontaneous ventilation    Post pain: adequate analgesia    Post assessment: no apparent anesthetic complications and tolerated procedure well    Post vital signs: stable    Level of consciousness: sedated and awake    Nausea/Vomiting: no nausea/vomiting    Complications: none    Transfer of care protocol was followed      Last vitals:   Visit Vitals  BP (!) 146/66 (BP Location: Right arm, Patient Position: Lying)   Pulse 60   Temp 36.7 °C (98.1 °F) (Skin)   Resp 16   Ht 5' 3" (1.6 m)   Wt 60.8 kg (134 lb)   SpO2 96%   Breastfeeding No   BMI 23.74 kg/m²     "

## 2022-09-21 NOTE — DISCHARGE SUMMARY
[facility]  Discharge Note  Short Stay  Discharge Note  Short Stay      SUMMARY     Admit Date: 9/21/2022    Attending Physician: Dejon Horta MD     Discharge Physician: Dejon Horta MD    Discharge Date: 9/21/2022 9:12 AM    Final Diagnosis: Epigastric pain [R10.13]  Nausea [R11.0]  Constipation, unspecified constipation type [K59.00]  Weight loss [R63.4]    Disposition: HOME OR SELF CARE    Patient Instructions:   Current Discharge Medication List        CONTINUE these medications which have NOT CHANGED    Details   ALPRAZolam (XANAX) 0.5 MG tablet Take 0.5 mg by mouth 2 (two) times daily as needed.      atorvastatin (LIPITOR) 20 MG tablet Take 20 mg by mouth once daily.      clopidogrel (PLAVIX) 75 mg tablet Take 75 mg by mouth once daily.      HYDROcodone-acetaminophen (NORCO) 7.5-325 mg per tablet TK 1 T PO BID    Comments: Quantity prescribed more than 7 day supply? Press F2 and select one:[id number]        omeprazole (PRILOSEC) 40 MG capsule Take 40 mg by mouth once daily.      vit A/vit C/vit E/zinc/copper (PRESERVISION AREDS ORAL) PreserVision AREDS      amoxicillin (AMOXIL) 875 MG tablet For dental work      dicyclomine (BENTYL) 10 MG capsule dicyclomine 10 mg capsule   TAKE 1 CAPSULE BY MOUTH THREE TIMES DAILY AS NEEDED      FLUoxetine 20 MG capsule 1 capsule in the morning      gabapentin (NEURONTIN) 300 MG capsule TAKE 1 CAPSULE(300 MG) BY MOUTH EVERY EVENING  Qty: 30 capsule, Refills: 0      ILEVRO 0.3 % DrpS       ofloxacin (OCUFLOX) 0.3 % ophthalmic solution       ondansetron (ZOFRAN) 8 MG tablet Take 1 tablet (8 mg total) by mouth every 12 (twelve) hours as needed for Nausea.  Qty: 30 tablet, Refills: 0    Associated Diagnoses: Nausea      pantoprazole (PROTONIX) 40 MG tablet TAKE 1 TABLET(40 MG) BY MOUTH EVERY DAY  Qty: 30 tablet, Refills: 2    Associated Diagnoses: Epigastric pain      potassium chloride (K-TAB) 20 mEq potassium chloride ER 20 mEq tablet,extended release    TAKE 1 TABLET BY MOUTH EVERY DAY      temazepam (RESTORIL) 30 mg capsule temazepam 30 mg capsule   1 capsule at bedtime as needed; 30 MG; Once a day      traMADol (ULTRAM) 50 mg tablet Take 1 tablet (50 mg total) by mouth 3 (three) times daily as needed for Pain.  Qty: 20 tablet, Refills: 1             Discharge Procedure Orders (must include Diet, Follow-up, Activity)    Follow Up:  Follow up with PCP as previously scheduled  Resume routine diet.  Activity as tolerated.    No driving day of procedure.

## 2022-09-21 NOTE — PLAN OF CARE
Pt AAOx3. Resp even, unlabored. No complaints of pain at this time. NAD noted. Pt tolerating PO well. Discharge and follow-up instructions discussed. Pt and family verbalize understanding. Pt ready for discharge.

## 2022-09-21 NOTE — ANESTHESIA PREPROCEDURE EVALUATION
09/21/2022  Yolanda Zapata is a 72 y.o., female.      Pre-op Assessment    I have reviewed the Patient Summary Reports.     I have reviewed the Nursing Notes. I have reviewed the NPO Status.   I have reviewed the Medications.     Review of Systems  Anesthesia Hx:  No problems with previous Anesthesia Denies Hx of Anesthetic complications    Social:  Smoker Smoking Status: Every Day  Smokeless Tobacco Status: Never  Alcohol use: Yes, unspecified volume  Drug use: Never       Cardiovascular:   Denies Hypertension.  Denies MI.  Denies CAD.    Denies CABG/stent.   Denies Angina.    Pulmonary:   Denies COPD.  Denies Asthma.  Denies Recent URI.    Renal/:   Denies Chronic Renal Disease.     Hepatic/GI:   Denies GERD. Denies Liver Disease.    Neurological:   Denies TIA. CVA Denies Seizures.    Endocrine:   Denies Diabetes. Denies Hypothyroidism.    Psych:   Denies Psychiatric History.          Physical Exam  General: Well nourished, Cooperative, Alert and Oriented    Airway:  Mallampati: II / II  Mouth Opening: Normal  TM Distance: 4 - 6 cm  Tongue: Normal    Dental:  Intact    Chest/Lungs:  Clear to auscultation, Normal Respiratory Rate    Heart:  Rate: Normal  Rhythm: Regular Rhythm  Sounds: Normal        Anesthesia Plan  Type of Anesthesia, risks & benefits discussed:    Anesthesia Type: Gen Natural Airway  Intra-op Monitoring Plan: Standard ASA Monitors  Induction:  IV  Informed Consent: Informed consent signed with the Patient and all parties understand the risks and agree with anesthesia plan.  All questions answered.   ASA Score: 3    Ready For Surgery From Anesthesia Perspective.     .

## 2022-09-21 NOTE — TELEPHONE ENCOUNTER
----- Message from Amina Meadows sent at 9/21/2022 11:32 AM CDT -----  Contact: Pt  Type: Needs Medical Advice    Who Called:  Pt    Best Call Back Number: [phone]    Additional Information: Pt had EGD this morning, and states office is sending copy of report to wrong doctor, states she doesn't see Dr. Alejandra Blake anymore.  Her PCP is Dr. Elieser Hurtado.    Please call back.  Thanks.

## 2022-09-21 NOTE — H&P
History & Physical - Short Stay  Gastroenterology      SUBJECTIVE:     Procedure: EGD    Chief Complaint/Indication for Procedure: Epigastric Pain and Weight Loss    Facility-Administered Medications Prior to Admission   Medication    triamcinolone acetonide injection 80 mg     PTA Medications   Medication Sig    ALPRAZolam (XANAX) 0.5 MG tablet Take 0.5 mg by mouth 2 (two) times daily as needed.    atorvastatin (LIPITOR) 20 MG tablet Take 20 mg by mouth once daily.    clopidogrel (PLAVIX) 75 mg tablet Take 75 mg by mouth once daily.    HYDROcodone-acetaminophen (NORCO) 7.5-325 mg per tablet TK 1 T PO BID    omeprazole (PRILOSEC) 40 MG capsule Take 40 mg by mouth once daily.    vit A/vit C/vit E/zinc/copper (PRESERVISION AREDS ORAL) PreserVision AREDS    amoxicillin (AMOXIL) 875 MG tablet For dental work    dicyclomine (BENTYL) 10 MG capsule dicyclomine 10 mg capsule   TAKE 1 CAPSULE BY MOUTH THREE TIMES DAILY AS NEEDED    FLUoxetine 20 MG capsule 1 capsule in the morning    gabapentin (NEURONTIN) 300 MG capsule TAKE 1 CAPSULE(300 MG) BY MOUTH EVERY EVENING (Patient not taking: Reported on 9/16/2022)    ILEVRO 0.3 % DrpS     ofloxacin (OCUFLOX) 0.3 % ophthalmic solution     ondansetron (ZOFRAN) 8 MG tablet Take 1 tablet (8 mg total) by mouth every 12 (twelve) hours as needed for Nausea.    pantoprazole (PROTONIX) 40 MG tablet TAKE 1 TABLET(40 MG) BY MOUTH EVERY DAY    potassium chloride (K-TAB) 20 mEq potassium chloride ER 20 mEq tablet,extended release   TAKE 1 TABLET BY MOUTH EVERY DAY    temazepam (RESTORIL) 30 mg capsule temazepam 30 mg capsule   1 capsule at bedtime as needed; 30 MG; Once a day    traMADol (ULTRAM) 50 mg tablet Take 1 tablet (50 mg total) by mouth 3 (three) times daily as needed for Pain.       Review of patient's allergies indicates:   Allergen Reactions    Sulfa (sulfonamide antibiotics) Rash        Past Medical History:   Diagnosis Date    Esophageal candidiasis     Gastritis     Personal  history of colonic polyps     Stroke      Past Surgical History:   Procedure Laterality Date    CHOLECYSTECTOMY  12/2021    COLONOSCOPY  11/12/2021    Dr. Ritchie; polyps removed; diverticulosis; repeat in 5 years    HIP SURGERY      UPPER GASTROINTESTINAL ENDOSCOPY  10/15/2021    JOSE Ritchie; esophageal candidiasis; gastritis; Bx: fungal pseudohyphae and rare budding yeast; otherwise, unremarkable     History reviewed. No pertinent family history.  Social History     Tobacco Use    Smoking status: Every Day     Packs/day: 1.00     Types: Cigarettes    Smokeless tobacco: Never   Substance Use Topics    Alcohol use: Yes     Comment: 3 glasses of wine per month    Drug use: Never         OBJECTIVE:     Vital Signs (Most Recent)  Temp: 98.1 °F (36.7 °C) (09/21/22 0833)  Pulse: 60 (09/21/22 0833)  Resp: 16 (09/21/22 0833)  BP: (!) 146/66 (09/21/22 0833)  SpO2: 96 % (09/21/22 0833)    Physical Exam:                                                       GENERAL:  Comfortable, in no acute distress.                                 HEENT EXAM:  Nonicteric.  No adenopathy.  Oropharynx is clear.               NECK:  Supple.                                                               LUNGS:  Clear.                                                               CARDIAC:  Regular rate and rhythm.  S1, S2.  No murmur.                      ABDOMEN:  Soft, positive bowel sounds, nontender.  No hepatosplenomegaly or masses.  No rebound or guarding.                                             EXTREMITIES:  No edema.     MENTAL STATUS:  Normal, alert and oriented.      ASSESSMENT/PLAN:     Assessment: Epigastric Pain and Weight Loss    Plan: EGD    Anesthesia Plan: General    ASA Grade: ASA 2 - Patient with mild systemic disease with no functional limitations    MALLAMPATI SCORE:  I (soft palate, uvula, fauces, and tonsillar pillars visible)

## 2022-09-21 NOTE — TELEPHONE ENCOUNTER
Pt stating that she was reading the report given to her this morning from her EGD and sees where it states  a copy of the report was sent to Dr. Blake and she does not want Dr. Blake to receive a copy of the report. Advised her Dr. Blake was listed as her PCP in her chart and this is why a copy of the report would be sent. Pt requested Dr. Blake be removed from her chart as her PCP. Removed it per pt request. Pt verbalized understanding

## 2022-09-23 VITALS
SYSTOLIC BLOOD PRESSURE: 125 MMHG | HEART RATE: 60 BPM | OXYGEN SATURATION: 97 % | DIASTOLIC BLOOD PRESSURE: 60 MMHG | BODY MASS INDEX: 23.74 KG/M2 | HEIGHT: 63 IN | TEMPERATURE: 98 F | RESPIRATION RATE: 18 BRPM | WEIGHT: 134 LBS

## 2022-09-26 LAB
COMMENT: NORMAL
FINAL PATHOLOGIC DIAGNOSIS: NORMAL
GROSS: NORMAL
Lab: NORMAL

## 2022-09-29 ENCOUNTER — TELEPHONE (OUTPATIENT)
Dept: GASTROENTEROLOGY | Facility: CLINIC | Age: 72
End: 2022-09-29
Payer: MEDICARE

## 2022-09-29 NOTE — TELEPHONE ENCOUNTER
----- Message from Yeimy Becerra sent at 9/29/2022  9:01 AM CDT -----  Regarding: return call  Type:  Patient Returning Call    Who Called: HUNG ANN [[id number]]    Who Left Message for Patient:Unknown    Does the patient know what this is regarding? results    Best Call Back Number: [phone]

## 2022-11-29 ENCOUNTER — OFFICE VISIT (OUTPATIENT)
Dept: GASTROENTEROLOGY | Facility: CLINIC | Age: 72
End: 2022-11-29
Payer: MEDICARE

## 2022-11-29 VITALS — HEIGHT: 63 IN | WEIGHT: 134.25 LBS | BODY MASS INDEX: 23.79 KG/M2 | RESPIRATION RATE: 18 BRPM

## 2022-11-29 DIAGNOSIS — R10.13 EPIGASTRIC PAIN: Primary | ICD-10-CM

## 2022-11-29 DIAGNOSIS — R63.4 WEIGHT LOSS: ICD-10-CM

## 2022-11-29 PROCEDURE — 99213 OFFICE O/P EST LOW 20 MIN: CPT | Mod: S$PBB,,, | Performed by: INTERNAL MEDICINE

## 2022-11-29 PROCEDURE — 99213 OFFICE O/P EST LOW 20 MIN: CPT | Mod: PBBFAC,PO | Performed by: INTERNAL MEDICINE

## 2022-11-29 PROCEDURE — 99213 PR OFFICE/OUTPT VISIT, EST, LEVL III, 20-29 MIN: ICD-10-PCS | Mod: S$PBB,,, | Performed by: INTERNAL MEDICINE

## 2022-11-29 PROCEDURE — 99999 PR PBB SHADOW E&M-EST. PATIENT-LVL III: CPT | Mod: PBBFAC,,, | Performed by: INTERNAL MEDICINE

## 2022-11-29 PROCEDURE — 99999 PR PBB SHADOW E&M-EST. PATIENT-LVL III: ICD-10-PCS | Mod: PBBFAC,,, | Performed by: INTERNAL MEDICINE

## 2022-11-29 RX ORDER — BENAZEPRIL HYDROCHLORIDE 20 MG/1
20 TABLET ORAL DAILY
COMMUNITY

## 2022-11-29 RX ORDER — ESCITALOPRAM OXALATE 20 MG/1
20 TABLET ORAL DAILY
COMMUNITY

## 2022-11-29 NOTE — PROGRESS NOTES
F/U chronic epigastric pain, dating back 1.5 years. Pain nearly constant, worse after eating. Positive weight loss. Decreased appetite. Intermittent diarrhea and constipation. No rectal bleeding. No vomiting. No fever or jaundice. Recent C-scope per [facility] 12/2021 benign polyps. Recent EGD unremarkable. Taking protonix. Ct scan with kidney cyst, evaluated by Urology.     REVIEW OF SYSTEMS:   Constitutional: Negative for fever; Positive decreased appetite and weight.  HENT: Negative for sore throat and trouble swallowing.  Eyes: Negative for visual disturbance.  Respiratory: Negative for chest tightness, shortness of breath and wheezing.  Cardiovascular: Negative for chest pain.  Gastrointestinal:  as per HPI    PHYSICAL EXAMINATION:                                                        GENERAL:  Comfortable, in no acute distress.      SKIN: Non-jaundiced.                             HEENT EXAM:  Nonicteric.  No adenopathy.  Oropharynx is clear.               NECK:  Supple.                                                               LUNGS:  Clear.                                                               CARDIAC:  Regular rate and rhythm.  S1, S2.  No murmur.                      ABDOMEN:  Soft, positive bowel sounds, nontender.  No hepatosplenomegaly or masses.  No rebound or guarding.                                             EXTREMITIES:  No edema.     NEURO: CN II-XII intact; motor/sensory non-focal.    IMP: 1. Chronic epigastric pain          2. Weight loss - r/o mesenteric angina    PLAN: 1. CTA of celiac/mesenteric vessels

## 2022-11-30 ENCOUNTER — HOSPITAL ENCOUNTER (OUTPATIENT)
Dept: RADIOLOGY | Facility: HOSPITAL | Age: 72
Discharge: HOME | End: 2022-11-30
Attending: INTERNAL MEDICINE
Payer: MEDICARE

## 2022-11-30 DIAGNOSIS — R10.13 EPIGASTRIC PAIN: ICD-10-CM

## 2022-11-30 PROCEDURE — 25500020 PHARM REV CODE 255: Mod: PO | Performed by: INTERNAL MEDICINE

## 2022-11-30 PROCEDURE — 74175 CTA ABDOMEN W/CONTRAST: CPT | Mod: 26,,, | Performed by: RADIOLOGY

## 2022-11-30 PROCEDURE — 74175 CTA ABDOMEN: ICD-10-PCS | Mod: 26,,, | Performed by: RADIOLOGY

## 2022-11-30 PROCEDURE — 74175 CTA ABDOMEN W/CONTRAST: CPT | Mod: TC,PO

## 2022-11-30 RX ADMIN — IOHEXOL 100 ML: 350 INJECTION, SOLUTION INTRAVENOUS at 12:11

## 2022-12-05 ENCOUNTER — TELEPHONE (OUTPATIENT)
Dept: GASTROENTEROLOGY | Facility: CLINIC | Age: 72
End: 2022-12-05
Payer: MEDICARE

## 2022-12-05 DIAGNOSIS — R10.13 EPIGASTRIC PAIN: Primary | ICD-10-CM

## 2022-12-05 DIAGNOSIS — R63.4 WEIGHT LOSS: ICD-10-CM

## 2022-12-05 NOTE — TELEPHONE ENCOUNTER
----- Message from Renee Horvath sent at 12/5/2022  1:14 PM CST -----  Contact: callled at [phone]  Type: Needs Medical Advice  Who called: Pt  Best Call Back Number: [phone]  Additional Information: Pt is calling because she had a CT done 11/30/2022, but still hadn't heard anything back regarding the results. Please call back and advise.

## 2022-12-05 NOTE — TELEPHONE ENCOUNTER
Tell pt CT shows mild atherosclerosis of blood vessels, not significant to cause symptoms.   Recommend gastric empty study to assess how well stomach is working, and referral to [facility] [place] opinion.

## 2022-12-06 NOTE — TELEPHONE ENCOUNTER
Spoke with pt. Informed of results & recommendations per Dr. Horta. Pt verbalized understanding.

## 2023-01-25 ENCOUNTER — TELEPHONE (OUTPATIENT)
Dept: GASTROENTEROLOGY | Facility: CLINIC | Age: 73
End: 2023-01-25
Payer: MEDICARE

## 2023-01-25 NOTE — TELEPHONE ENCOUNTER
Left message for pt that the canceled test was a GES & to call [facility] [place]. Number provided.

## 2023-01-25 NOTE — TELEPHONE ENCOUNTER
----- Message from Floridalma uRsso sent at 1/25/2023  3:43 PM CST -----  Type: Needs Medical Advice  Who Called:  pt  Symptoms (please be specific):  pt said she need to speak to  the office said she need to have the test scheduled that she canceled said she did not know the name of the test but the office will--please call and advise  Best Call Back Number:[phone]     Additional Information: thank you

## 2023-02-10 ENCOUNTER — HOSPITAL ENCOUNTER (OUTPATIENT)
Dept: RADIOLOGY | Facility: HOSPITAL | Age: 73
Discharge: HOME | End: 2023-02-10
Attending: INTERNAL MEDICINE
Payer: MEDICARE

## 2023-02-10 DIAGNOSIS — R63.4 WEIGHT LOSS: ICD-10-CM

## 2023-02-10 DIAGNOSIS — R10.13 EPIGASTRIC PAIN: ICD-10-CM

## 2023-02-10 PROCEDURE — 78264 NM GASTRIC EMPTYING: ICD-10-PCS | Mod: 26,,, | Performed by: RADIOLOGY

## 2023-02-10 PROCEDURE — 78264 GASTRIC EMPTYING IMG STUDY: CPT | Mod: TC,PO

## 2023-02-10 PROCEDURE — 78264 GASTRIC EMPTYING IMG STUDY: CPT | Mod: 26,,, | Performed by: RADIOLOGY

## 2023-02-13 ENCOUNTER — TELEPHONE (OUTPATIENT)
Dept: GASTROENTEROLOGY | Facility: CLINIC | Age: 73
End: 2023-02-13
Payer: MEDICARE

## 2023-02-13 NOTE — TELEPHONE ENCOUNTER
Patient returned call, informed patient once Dr. Horta responds to Christa's message we will send over the recommendations. Pt verbalized understanding

## 2023-02-13 NOTE — TELEPHONE ENCOUNTER
----- Message from Jeana Henriquez sent at 2/13/2023  4:12 PM CST -----  Regarding: PATIENT CALL BACK  Contact: patient  Name of Who is Calling: HUNG ANN [[id number]]      What is the request in detail: Patient would like a call back. She states she would like to speak with the nurse before the nurse speak with the doctor regarding care plan. Please advise!       Can the clinic reply by [facility]: NO        What Number to Call Back if not in [facility]: [phone]

## 2023-02-13 NOTE — TELEPHONE ENCOUNTER
----- Message from Jeana Henriquez sent at 2/13/2023  3:26 PM CST -----  Regarding: PATIENT RETURNING CALL  Contact: PATIENT  Type:  Patient Returning Call    Who Called: HUNG ANN [[id number]]    Who Left Message for Patient: Office Staff    Does the patient know what this is regarding? YES    Best Call Back Number: [phone]     Additional Information: PATIENT IS REQUESTING A CALL BACK REGARDING TEST RESULTS. PLEASE ADVISE!

## 2023-02-13 NOTE — TELEPHONE ENCOUNTER
Spoke with pt. Informed of results & recommendations per Dr. Horta. Pt verbalized understanding. Pt states going to [facility] is extremely difficult as she cannot drive & would need to depend on others to get her there. Pt wanted to know if there was a test to check for a hiatal hernia before she attempts for find rides to the [facility].

## 2023-02-22 ENCOUNTER — TELEPHONE (OUTPATIENT)
Dept: GASTROENTEROLOGY | Facility: CLINIC | Age: 73
End: 2023-02-22
Payer: MEDICARE

## 2023-02-22 DIAGNOSIS — K44.9 HERNIA, HIATAL: Primary | ICD-10-CM

## 2023-02-22 NOTE — TELEPHONE ENCOUNTER
Spoke with pt. Informed of recommendations per Dr. Horta. Pt verbalized understanding & scheduled appointment with general surgery.

## 2023-02-22 NOTE — TELEPHONE ENCOUNTER
Spoke with pt. Pt states she is unable to go to [place] [facility] for second opinion due to transportation issues. Pt would like to know if there are any tests available to test for a hernia as she believes this is causing her pain.

## 2023-02-22 NOTE — TELEPHONE ENCOUNTER
Very small hiatal hernia per recent EGD, does not appear significant.  Recommend referral to gen surg for opinion.

## 2023-02-28 ENCOUNTER — OFFICE VISIT (OUTPATIENT)
Dept: SURGERY | Facility: CLINIC | Age: 73
End: 2023-02-28
Payer: MEDICARE

## 2023-02-28 VITALS
DIASTOLIC BLOOD PRESSURE: 75 MMHG | HEART RATE: 64 BPM | TEMPERATURE: 98 F | HEIGHT: 63 IN | WEIGHT: 145.31 LBS | BODY MASS INDEX: 25.75 KG/M2 | SYSTOLIC BLOOD PRESSURE: 186 MMHG

## 2023-02-28 DIAGNOSIS — K44.9 HERNIA, HIATAL: ICD-10-CM

## 2023-02-28 PROCEDURE — 99204 PR OFFICE/OUTPT VISIT, NEW, LEVL IV, 45-59 MIN: ICD-10-PCS | Mod: S$PBB,,, | Performed by: STUDENT IN AN ORGANIZED HEALTH CARE EDUCATION/TRAINING PROGRAM

## 2023-02-28 PROCEDURE — 99999 PR PBB SHADOW E&M-EST. PATIENT-LVL IV: CPT | Mod: PBBFAC,,, | Performed by: STUDENT IN AN ORGANIZED HEALTH CARE EDUCATION/TRAINING PROGRAM

## 2023-02-28 PROCEDURE — 99214 OFFICE O/P EST MOD 30 MIN: CPT | Mod: PBBFAC,PO | Performed by: STUDENT IN AN ORGANIZED HEALTH CARE EDUCATION/TRAINING PROGRAM

## 2023-02-28 PROCEDURE — 99204 OFFICE O/P NEW MOD 45 MIN: CPT | Mod: S$PBB,,, | Performed by: STUDENT IN AN ORGANIZED HEALTH CARE EDUCATION/TRAINING PROGRAM

## 2023-02-28 PROCEDURE — 99999 PR PBB SHADOW E&M-EST. PATIENT-LVL IV: ICD-10-PCS | Mod: PBBFAC,,, | Performed by: STUDENT IN AN ORGANIZED HEALTH CARE EDUCATION/TRAINING PROGRAM

## 2023-02-28 RX ORDER — ALPRAZOLAM 0.5 MG/1
0.5 TABLET ORAL 2 TIMES DAILY PRN
COMMUNITY
Start: 2023-02-07

## 2023-02-28 RX ORDER — TRAMADOL HYDROCHLORIDE 50 MG/1
50 TABLET ORAL 2 TIMES DAILY PRN
COMMUNITY
Start: 2023-02-15

## 2023-02-28 RX ORDER — OMEPRAZOLE 40 MG/1
1 CAPSULE, DELAYED RELEASE ORAL DAILY
COMMUNITY
Start: 2023-01-04 | End: 2023-03-08 | Stop reason: SDUPTHER

## 2023-03-17 NOTE — TELEPHONE ENCOUNTER
Called and notified pt of results and recommendations. Pt verbalized understanding    Kai Mar, left a msg stating that this pt was recently in the hospital for back pain.  She was given 5 pills, and the daughter is asking for a refill and have it as a scheduled drug.

## 2023-05-04 DIAGNOSIS — R11.0 NAUSEA: ICD-10-CM

## 2023-05-05 RX ORDER — ONDANSETRON HYDROCHLORIDE 8 MG/1
TABLET, FILM COATED ORAL
Qty: 30 TABLET | Refills: 0 | Status: SHIPPED | OUTPATIENT
Start: 2023-05-05 | End: 2023-06-02

## 2023-06-02 DIAGNOSIS — K59.09 CHRONIC CONSTIPATION WITH OVERFLOW: ICD-10-CM

## 2023-06-02 DIAGNOSIS — R11.0 NAUSEA: ICD-10-CM

## 2023-06-02 RX ORDER — ONDANSETRON HYDROCHLORIDE 8 MG/1
TABLET, FILM COATED ORAL
Qty: 30 TABLET | Refills: 0 | Status: SHIPPED | OUTPATIENT
Start: 2023-06-02 | End: 2023-07-05

## 2023-06-05 RX ORDER — POLYETHYLENE GLYCOL 3350 17 G/17G
POWDER, FOR SOLUTION ORAL
Qty: 510 G | Refills: 1 | Status: SHIPPED | OUTPATIENT
Start: 2023-06-05 | End: 2023-10-03

## 2023-07-05 DIAGNOSIS — R11.0 NAUSEA: ICD-10-CM

## 2023-07-05 RX ORDER — ONDANSETRON HYDROCHLORIDE 8 MG/1
TABLET, FILM COATED ORAL
Qty: 30 TABLET | Refills: 0 | Status: SHIPPED | OUTPATIENT
Start: 2023-07-05 | End: 2023-09-25

## 2023-07-13 DIAGNOSIS — R10.13 EPIGASTRIC PAIN: ICD-10-CM

## 2023-07-13 RX ORDER — PANTOPRAZOLE SODIUM 40 MG/1
TABLET, DELAYED RELEASE ORAL
Qty: 90 TABLET | Refills: 2 | Status: SHIPPED | OUTPATIENT
Start: 2023-07-13 | End: 2024-02-05

## 2023-08-10 NOTE — TELEPHONE ENCOUNTER
----- Message from Lucy Foley sent at 5/10/2019  9:03 AM CDT -----  Contact: Yolanda jones  Type:  RX Refill Request    Who Called:  Yolanda  Refill or New Rx:  refill  RX Name and Strength:  traMADol (ULTRAM) 50 mg tablet  How is the patient currently taking it? (ex. 1XDay):  As directed  Is this a 30 day or 90 day RX:  30  Preferred Pharmacy with phone number:    [facility] [id number] [place] [address] & [address] [address]  Phone: [phone] Fax: [phone]      Local or Mail Order:  LOCAL  Ordering Provider:  Flash  Best Call Back Number:  [phone]  Additional Information:  Pls call pt regarding rx. She is just about out and has a procedure coming  up   Refill    [place]    [facility]

## 2023-09-24 DIAGNOSIS — R11.0 NAUSEA: ICD-10-CM

## 2023-09-25 RX ORDER — ONDANSETRON HYDROCHLORIDE 8 MG/1
TABLET, FILM COATED ORAL
Qty: 30 TABLET | Refills: 0 | Status: SHIPPED | OUTPATIENT
Start: 2023-09-25 | End: 2023-10-30

## 2023-10-03 DIAGNOSIS — K59.09 CHRONIC CONSTIPATION WITH OVERFLOW: ICD-10-CM

## 2023-10-03 RX ORDER — POLYETHYLENE GLYCOL 3350 17 G/17G
POWDER, FOR SOLUTION ORAL
Qty: 510 G | Refills: 1 | Status: SHIPPED | OUTPATIENT
Start: 2023-10-03

## 2023-10-29 DIAGNOSIS — R11.0 NAUSEA: ICD-10-CM

## 2023-10-30 RX ORDER — ONDANSETRON HYDROCHLORIDE 8 MG/1
TABLET, FILM COATED ORAL
Qty: 30 TABLET | Refills: 0 | Status: SHIPPED | OUTPATIENT
Start: 2023-10-30 | End: 2024-02-01

## 2024-02-01 DIAGNOSIS — R11.0 NAUSEA: ICD-10-CM

## 2024-02-01 RX ORDER — ONDANSETRON HYDROCHLORIDE 8 MG/1
TABLET, FILM COATED ORAL
Qty: 30 TABLET | Refills: 0 | Status: SHIPPED | OUTPATIENT
Start: 2024-02-01 | End: 2024-04-01

## 2024-02-05 DIAGNOSIS — R10.13 EPIGASTRIC PAIN: ICD-10-CM

## 2024-02-05 RX ORDER — PANTOPRAZOLE SODIUM 40 MG/1
TABLET, DELAYED RELEASE ORAL
Qty: 90 TABLET | Refills: 2 | Status: SHIPPED | OUTPATIENT
Start: 2024-02-05

## 2024-03-21 RX ORDER — OMEPRAZOLE 40 MG/1
40 CAPSULE, DELAYED RELEASE ORAL
Qty: 90 CAPSULE | Refills: 3 | Status: SHIPPED | OUTPATIENT
Start: 2024-03-21

## 2024-04-01 DIAGNOSIS — R11.0 NAUSEA: ICD-10-CM

## 2024-04-01 RX ORDER — ONDANSETRON HYDROCHLORIDE 8 MG/1
TABLET, FILM COATED ORAL
Qty: 30 TABLET | Refills: 0 | Status: SHIPPED | OUTPATIENT
Start: 2024-04-01

## 2024-04-26 ENCOUNTER — TELEPHONE (OUTPATIENT)
Dept: PAIN MEDICINE | Facility: CLINIC | Age: 74
End: 2024-04-26
Payer: MEDICARE

## 2024-04-26 NOTE — TELEPHONE ENCOUNTER
----- Message from Taal Huston sent at 4/26/2024  2:12 PM CDT -----  Type:  Sooner Apoointment Request    Caller is requesting a sooner appointment.  Caller declined first available appointment listed below.  Caller will not accept being placed on the waitlist and is requesting a message be sent to doctor.    Name of Caller:pt  When is the first available appointment?  Symptoms:  Would the patient rather a call back or a response via [facility]? call  Best Call Back Number: [phone]  Additional Information: pt would like a call back to discuss a particular procedure and possibly schedule an appointment, please call

## 2024-12-05 DIAGNOSIS — R10.13 EPIGASTRIC PAIN: ICD-10-CM

## 2024-12-05 RX ORDER — PANTOPRAZOLE SODIUM 40 MG/1
TABLET, DELAYED RELEASE ORAL
Qty: 90 TABLET | Refills: 2 | Status: SHIPPED | OUTPATIENT
Start: 2024-12-05

## 2025-03-31 RX ORDER — OMEPRAZOLE 40 MG/1
40 CAPSULE, DELAYED RELEASE ORAL
Qty: 90 CAPSULE | Refills: 0 | Status: SHIPPED | OUTPATIENT
Start: 2025-03-31

## 2025-04-30 ENCOUNTER — TELEPHONE (OUTPATIENT)
Dept: ALLERGY | Facility: CLINIC | Age: 75
End: 2025-04-30
Payer: MEDICARE

## 2025-05-15 DIAGNOSIS — L50.9 URTICARIA: Primary | ICD-10-CM

## 2025-05-19 RX ORDER — OMEPRAZOLE 40 MG/1
40 CAPSULE, DELAYED RELEASE ORAL
Qty: 90 CAPSULE | Refills: 0 | Status: SHIPPED | OUTPATIENT
Start: 2025-05-19

## 2025-06-23 ENCOUNTER — LAB VISIT (OUTPATIENT)
Dept: LAB | Facility: HOSPITAL | Age: 75
End: 2025-06-23
Attending: ALLERGY & IMMUNOLOGY
Payer: MEDICARE

## 2025-06-23 ENCOUNTER — OFFICE VISIT (OUTPATIENT)
Dept: ALLERGY | Facility: CLINIC | Age: 75
End: 2025-06-23
Payer: MEDICARE

## 2025-06-23 VITALS — BODY MASS INDEX: 23.93 KG/M2 | WEIGHT: 140.19 LBS | HEIGHT: 64 IN

## 2025-06-23 DIAGNOSIS — R05.3 CHRONIC COUGH: ICD-10-CM

## 2025-06-23 DIAGNOSIS — L29.9 PRURITUS: ICD-10-CM

## 2025-06-23 DIAGNOSIS — J31.0 CHRONIC RHINITIS: ICD-10-CM

## 2025-06-23 DIAGNOSIS — L30.9 DERMATITIS: ICD-10-CM

## 2025-06-23 DIAGNOSIS — L29.9 PRURITUS: Primary | ICD-10-CM

## 2025-06-23 LAB
ERYTHROCYTE [SEDIMENTATION RATE] IN BLOOD BY PHOTOMETRIC METHOD: 5 MM/HR
IGA SERPL-MCNC: 172 MG/DL (ref 40–350)
IGG SERPL-MCNC: 854 MG/DL (ref 650–1600)
IGM SERPL-MCNC: 215 MG/DL (ref 50–300)

## 2025-06-23 PROCEDURE — 86003 ALLG SPEC IGE CRUDE XTRC EA: CPT | Mod: 59

## 2025-06-23 PROCEDURE — 86003 ALLG SPEC IGE CRUDE XTRC EA: CPT

## 2025-06-23 PROCEDURE — 99205 OFFICE O/P NEW HI 60 MIN: CPT | Mod: S$PBB,,, | Performed by: ALLERGY & IMMUNOLOGY

## 2025-06-23 PROCEDURE — 82785 ASSAY OF IGE: CPT

## 2025-06-23 PROCEDURE — 82784 ASSAY IGA/IGD/IGG/IGM EACH: CPT

## 2025-06-23 PROCEDURE — 99999 PR PBB SHADOW E&M-EST. PATIENT-LVL III: CPT | Mod: PBBFAC,,, | Performed by: ALLERGY & IMMUNOLOGY

## 2025-06-23 PROCEDURE — 99213 OFFICE O/P EST LOW 20 MIN: CPT | Mod: PBBFAC,PO | Performed by: ALLERGY & IMMUNOLOGY

## 2025-06-23 PROCEDURE — 82784 ASSAY IGA/IGD/IGG/IGM EACH: CPT | Mod: 59

## 2025-06-23 PROCEDURE — 85652 RBC SED RATE AUTOMATED: CPT

## 2025-06-23 PROCEDURE — 82787 IGG 1 2 3 OR 4 EACH: CPT | Mod: 59

## 2025-06-23 PROCEDURE — 36415 COLL VENOUS BLD VENIPUNCTURE: CPT | Mod: PO

## 2025-06-23 RX ORDER — LEVOCETIRIZINE DIHYDROCHLORIDE 5 MG/1
5 TABLET, FILM COATED ORAL DAILY
Qty: 30 TABLET | Refills: 11 | Status: SHIPPED | OUTPATIENT
Start: 2025-06-23 | End: 2026-06-23

## 2025-06-23 NOTE — PROGRESS NOTES
Subjective:       Patient ID: Yolanda Zapata is a 74 y.o. female.    Chief Complaint:  Urticaria and Rash      73 yo woman presents fro new patient evaluation of itching and rash. She states has been dealing with this daily or past 8 months. Her skin is very itchy and keeps her awake at night. She gets redness which spreads. Then gets red bumps which get very hard and bruise after. Last for days or longer. Has been to ER and given prednisone and antibiotics which do help but never clears completely. She is very itchy. She also has low appetite and feels very weak, having more trouble getting around. Has not seen PCP, states she only ha cardiologist. Not seen derm either. Not on any med's for itch. She gets it all over- face, arms, back, trunks, legs. Only ones she has now or more flat redness on arms and legs, kamlesh looking. She has sore throat as well, PND and cough with mucus. No sneeze or runny nose. No SOB. Has had lip swelling, no eye swelling. She has arthritis. She feels this all started 2 days after she had hyaluronic acid injection in knee, not sure if related.         Environmental History: see history section for home environment  Review of Systems   Constitutional:  Negative for chills and fever.   HENT:  Positive for facial swelling, postnasal drip and sore throat. Negative for congestion, rhinorrhea, sinus pressure and sneezing.    Eyes:  Negative for discharge and itching.   Respiratory:  Positive for cough. Negative for chest tightness and shortness of breath.    Musculoskeletal:  Positive for arthralgias.   Skin:  Positive for color change and rash.   Neurological:  Positive for weakness.   Psychiatric/Behavioral:  Positive for sleep disturbance.         Objective:      Physical Exam  Vitals and nursing note reviewed.   Constitutional:       General: She is not in acute distress.     Appearance: Normal appearance. She is not ill-appearing.   HENT:      Nose: No rhinorrhea.   Eyes:      General:          Right eye: No discharge.         Left eye: No discharge.      Conjunctiva/sclera: Conjunctivae normal.   Pulmonary:      Effort: Pulmonary effort is normal. No respiratory distress.   Abdominal:      General: There is no distension.   Skin:     General: Skin is warm and dry.      Findings: Erythema and rash (mild erythema in arms nad legs, no discreet hives o lesions) present.   Neurological:      Mental Status: She is alert.   Psychiatric:         Mood and Affect: Mood normal.         Behavior: Behavior normal.         Laboratory:   none performed   Assessment:       1. Pruritus    2. Dermatitis    3. Chronic rhinitis    4. Chronic cough         Plan:       dermatitis and pruritus- pt fair historian but time of lesions hard to interpret as she states they never clear then is unlikely C/w urticaria but as she has minimal now and says will come on in an hour is possible. [place] end labs to eval urticaria and trial levocetirizine 5 mg daily. If no improvement will refer to derm  Weakness and low appetite advised needs to follow up with PCP as soon as can, pt stats she only has cardiologist, advised to see him and establish with primary care  Phone review    I spent a total of 60 minutes on the day of the visit.  This includes face to face time and non-face to face time preparing to see the patient (eg, review of tests), obtaining and/or reviewing separately obtained history, documenting clinical information in the electronic or other health record, independently interpreting results and communicating results to the patient/family/caregiver, or care coordinator.

## 2025-06-24 LAB — IGE SERPL-ACNC: <21 IU/ML

## 2025-06-25 LAB
W ALMOND, CLASS: NORMAL
W ALMOND, IGE: <0.1 KU/L
W ALTERNARIA ALTERNATA, CLASS: NORMAL
W ALTERNARIA ALTERNATA, IGE: <0.1 KU/L
W ASPERGILLUS FUMIGATUS, CLASS: NORMAL
W ASPERGILLUS FUMIGATUS, IGE: <0.1 KU/L
W BAHIA GRASS, CLASS: NORMAL
W BAHIA GRASS, IGE: <0.1 KU/L
W BALD CYPRESS, CLASS: NORMAL
W BALD CYPRESS, IGE: <0.1 KU/L
W BERMUDA GRASS, CLASS: NORMAL
W BERMUDA GRASS, IGE: <0.1 KU/L
W CAT DANDER, CLASS: NORMAL
W CAT DANDER, IGE: <0.1 KU/L
W COMMON RAGWEED (SHORT), CLASS: NORMAL
W COMMON RAGWEED (SHORT), IGE: <0.1 KU/L
W COMMON SILVER BIRCH, CLASS: NORMAL
W COMMON SILVER BIRCH, IGE: <0.1 KU/L
W COTTONWOOD, CLASS: NORMAL
W COTTONWOOD, IGE: <0.1 KU/L
W COW'S MILK, CLASS: NORMAL
W COW'S MILK, IGE: <0.1 KU/L
W DERMATOPHAGOIDES FARINAE CLASS: NORMAL
W DERMATOPHAGOIDES FARINAE, IGE: <0.1 KU/L
W DERMATOPHAGOIDES PTERONYSSINUS CLASS: NORMAL
W DERMATOPHAGOIDES PTERONYSSINUS, IGE: <0.1 KU/L
W DOG DANDER, CLASS: NORMAL
W DOG DANDER, IGE: <0.1 KU/L
W EGG WHITE, CLASS: NORMAL
W EGG WHITE, IGE: <0.1 KU/L
W ENGLISH PLANTAIN, RIBWORT, CLASS: NORMAL
W ENGLISH PLANTAIN, RIBWORT, IGE: <0.1 KU/L
W OAK, CLASS: NORMAL
W OAK, IGE: <0.1 KU/L
W OAT , CLASS: NORMAL
W OAT , IGE: <0.1 KU/L
W PEANUT, CLASS: NORMAL
W PEANUT, IGE: <0.1 KU/L
W PECAN NUT, CLASS: NORMAL
W PECAN NUT, IGE: <0.1 KU/L
W PECAN, HICKORY, CLASS: NORMAL
W PECAN, HICKORY, IGE: <0.1 KU/L
W PENICILLIUM CHRYSOGENUM, CLASS: NORMAL
W PENICILLIUM CHRYSOGENUM, IGE: <0.1 KU/L
W ROUGH MARSHELDER, CLASS: NORMAL
W ROUGH MARSHELDER, IGE: <0.1 KU/L
W SESAME SEED , CLASS: NORMAL
W SESAME SEED , IGE: <0.1 KU/L
W SETOMELANOMMA ROSTRATA, CLASS: NORMAL
W SETOMELANOMMA ROSTRATA, IGE: <0.1 KU/L
W SOYBEAN, CLASS: NORMAL
W SOYBEAN, IGE: <0.1 KU/L
W SUNFLOWER SEED , CLASS: NORMAL
W SUNFLOWER SEED , IGE: <0.1 KU/L
W TIMOTHY GRASS, CLASS: NORMAL
W TIMOTHY GRASS, IGE: <0.1 KU/L
W WHEAT, CLASS: NORMAL
W WHEAT, IGE: <0.1 KU/L
W WILLOW, CLASS: NORMAL
W WILLOW, IGE: <0.1 KU/L

## 2025-06-26 LAB
W IMMUNOGLOBULIN G SUBCLASS 1: 466 MG/DL
W IMMUNOGLOBULIN G SUBCLASS 2: 106 MG/DL
W IMMUNOGLOBULIN G SUBCLASS 3: 31 MG/DL
W IMMUNOGLOBULIN G SUBCLASS 4: 35 MG/DL

## 2025-07-10 ENCOUNTER — PATIENT MESSAGE (OUTPATIENT)
Dept: ALLERGY | Facility: CLINIC | Age: 75
End: 2025-07-10
Payer: MEDICARE